# Patient Record
Sex: FEMALE | Race: BLACK OR AFRICAN AMERICAN | NOT HISPANIC OR LATINO
[De-identification: names, ages, dates, MRNs, and addresses within clinical notes are randomized per-mention and may not be internally consistent; named-entity substitution may affect disease eponyms.]

---

## 2022-11-08 PROBLEM — Z00.00 ENCOUNTER FOR PREVENTIVE HEALTH EXAMINATION: Status: ACTIVE | Noted: 2022-11-08

## 2022-11-30 PROBLEM — Z00.00 ENCOUNTER FOR PREVENTIVE HEALTH EXAMINATION: Status: ACTIVE | Noted: 2022-11-30

## 2022-12-07 ENCOUNTER — APPOINTMENT (OUTPATIENT)
Dept: OBGYN | Facility: CLINIC | Age: 27
End: 2022-12-07

## 2022-12-07 ENCOUNTER — OUTPATIENT (OUTPATIENT)
Dept: OUTPATIENT SERVICES | Facility: HOSPITAL | Age: 27
LOS: 1 days | Discharge: HOME | End: 2022-12-07

## 2022-12-07 VITALS
HEIGHT: 67 IN | DIASTOLIC BLOOD PRESSURE: 74 MMHG | SYSTOLIC BLOOD PRESSURE: 123 MMHG | BODY MASS INDEX: 26.68 KG/M2 | WEIGHT: 170 LBS

## 2022-12-07 PROCEDURE — 76815 OB US LIMITED FETUS(S): CPT | Mod: 26

## 2022-12-07 PROCEDURE — 99204 OFFICE O/P NEW MOD 45 MIN: CPT | Mod: 25

## 2022-12-08 LAB
SOURCE AMPLIFICATION: NORMAL
T VAGINALIS RRNA SPEC QL NAA+PROBE: NOT DETECTED

## 2022-12-09 LAB
C TRACH RRNA SPEC QL NAA+PROBE: NOT DETECTED
N GONORRHOEA RRNA SPEC QL NAA+PROBE: NOT DETECTED
SOURCE AMPLIFICATION: NORMAL

## 2022-12-10 DIAGNOSIS — Z34.90 ENCOUNTER FOR SUPERVISION OF NORMAL PREGNANCY, UNSPECIFIED, UNSPECIFIED TRIMESTER: ICD-10-CM

## 2022-12-11 LAB
ABO + RH PNL BLD: NORMAL
BASOPHILS # BLD AUTO: 0.05 K/UL
BASOPHILS NFR BLD AUTO: 0.7 %
BLD GP AB SCN SERPL QL: NORMAL
EOSINOPHIL # BLD AUTO: 0.47 K/UL
EOSINOPHIL NFR BLD AUTO: 6.3 %
ESTIMATED AVERAGE GLUCOSE: 103 MG/DL
HBA1C MFR BLD HPLC: 5.2 %
HBV SURFACE AG SER QL: NONREACTIVE
HCT VFR BLD CALC: 35.2 %
HCV AB SER QL: NONREACTIVE
HCV S/CO RATIO: 0.16 S/CO
HGB BLD-MCNC: 11.6 G/DL
HIV1+2 AB SPEC QL IA.RAPID: NONREACTIVE
IMM GRANULOCYTES NFR BLD AUTO: 0.3 %
LYMPHOCYTES # BLD AUTO: 2.08 K/UL
LYMPHOCYTES NFR BLD AUTO: 28 %
MAN DIFF?: NORMAL
MCHC RBC-ENTMCNC: 23.8 PG
MCHC RBC-ENTMCNC: 33 G/DL
MCV RBC AUTO: 72.3 FL
MEV IGG FLD QL IA: 139 AU/ML
MEV IGG+IGM SER-IMP: POSITIVE
MONOCYTES # BLD AUTO: 0.59 K/UL
MONOCYTES NFR BLD AUTO: 8 %
MUV AB SER-ACNC: POSITIVE
MUV IGG SER QL IA: 37.4 AU/ML
NEUTROPHILS # BLD AUTO: 4.21 K/UL
NEUTROPHILS NFR BLD AUTO: 56.7 %
PLATELET # BLD AUTO: 245 K/UL
RBC # BLD: 4.87 M/UL
RBC # FLD: 13.4 %
RUBV IGG FLD-ACNC: 3.4 INDEX
RUBV IGG SER-IMP: POSITIVE
T PALLIDUM AB SER QL IA: NEGATIVE
VZV AB TITR SER: POSITIVE
VZV IGG SER IF-ACNC: 618.4 INDEX
WBC # FLD AUTO: 7.42 K/UL

## 2022-12-12 ENCOUNTER — NON-APPOINTMENT (OUTPATIENT)
Age: 27
End: 2022-12-12

## 2022-12-12 LAB
CYTOLOGY CVX/VAG DOC THIN PREP: ABNORMAL
LEAD BLD-MCNC: <1 UG/DL

## 2022-12-12 RX ORDER — PNV NO.95/FERROUS FUM/FOLIC AC 28MG-0.8MG
27-0.8 TABLET ORAL DAILY
Qty: 90 | Refills: 3 | Status: ACTIVE | COMMUNITY
Start: 2022-12-12 | End: 1900-01-01

## 2022-12-14 LAB
HGB A MFR BLD: 97.6 %
HGB A2 MFR BLD: 2.4 %
HGB FRACT BLD-IMP: NORMAL

## 2022-12-15 LAB — FMR1 GENE MUT ANL BLD/T: NORMAL

## 2022-12-16 LAB — AR GENE MUT ANL BLD/T: NORMAL

## 2022-12-17 LAB — CFTR MUT TESTED BLD/T: NEGATIVE

## 2022-12-20 ENCOUNTER — RESULT REVIEW (OUTPATIENT)
Age: 27
End: 2022-12-20

## 2022-12-20 ENCOUNTER — OUTPATIENT (OUTPATIENT)
Dept: OUTPATIENT SERVICES | Facility: HOSPITAL | Age: 27
LOS: 1 days | Discharge: HOME | End: 2022-12-20

## 2022-12-20 DIAGNOSIS — N63.10 UNSPECIFIED LUMP IN THE RIGHT BREAST, UNSPECIFIED QUADRANT: ICD-10-CM

## 2022-12-20 PROCEDURE — 76641 ULTRASOUND BREAST COMPLETE: CPT | Mod: 26,50

## 2022-12-21 ENCOUNTER — APPOINTMENT (OUTPATIENT)
Dept: OBGYN | Facility: CLINIC | Age: 27
End: 2022-12-21

## 2022-12-22 ENCOUNTER — ASOB RESULT (OUTPATIENT)
Age: 27
End: 2022-12-22

## 2022-12-22 ENCOUNTER — OUTPATIENT (OUTPATIENT)
Dept: OUTPATIENT SERVICES | Facility: HOSPITAL | Age: 27
LOS: 1 days | Discharge: HOME | End: 2022-12-22

## 2022-12-22 ENCOUNTER — NON-APPOINTMENT (OUTPATIENT)
Age: 27
End: 2022-12-22

## 2022-12-22 ENCOUNTER — APPOINTMENT (OUTPATIENT)
Dept: ANTEPARTUM | Facility: CLINIC | Age: 27
End: 2022-12-22

## 2022-12-22 DIAGNOSIS — R92.8 OTHER ABNORMAL AND INCONCLUSIVE FINDINGS ON DIAGNOSTIC IMAGING OF BREAST: ICD-10-CM

## 2022-12-22 PROCEDURE — 76813 OB US NUCHAL MEAS 1 GEST: CPT | Mod: 26

## 2022-12-28 ENCOUNTER — RESULT REVIEW (OUTPATIENT)
Age: 27
End: 2022-12-28

## 2022-12-28 ENCOUNTER — OUTPATIENT (OUTPATIENT)
Dept: OUTPATIENT SERVICES | Facility: HOSPITAL | Age: 27
LOS: 1 days | Discharge: HOME | End: 2022-12-28
Payer: MEDICAID

## 2022-12-28 PROCEDURE — 19084 BX BREAST ADD LESION US IMAG: CPT | Mod: 59,RT

## 2022-12-28 PROCEDURE — 19083 BX BREAST 1ST LESION US IMAG: CPT | Mod: LT

## 2022-12-28 PROCEDURE — 88305 TISSUE EXAM BY PATHOLOGIST: CPT | Mod: 26

## 2022-12-29 LAB — SURGICAL PATHOLOGY STUDY: SIGNIFICANT CHANGE UP

## 2022-12-30 ENCOUNTER — NON-APPOINTMENT (OUTPATIENT)
Age: 27
End: 2022-12-30

## 2022-12-30 DIAGNOSIS — N64.89 OTHER SPECIFIED DISORDERS OF BREAST: ICD-10-CM

## 2022-12-30 DIAGNOSIS — N61.1 ABSCESS OF THE BREAST AND NIPPLE: ICD-10-CM

## 2022-12-30 DIAGNOSIS — N63.10 UNSPECIFIED LUMP IN THE RIGHT BREAST, UNSPECIFIED QUADRANT: ICD-10-CM

## 2022-12-30 DIAGNOSIS — D24.2 BENIGN NEOPLASM OF LEFT BREAST: ICD-10-CM

## 2022-12-30 DIAGNOSIS — D24.1 BENIGN NEOPLASM OF RIGHT BREAST: ICD-10-CM

## 2023-01-04 ENCOUNTER — RESULT CHARGE (OUTPATIENT)
Age: 28
End: 2023-01-04

## 2023-01-04 ENCOUNTER — NON-APPOINTMENT (OUTPATIENT)
Age: 28
End: 2023-01-04

## 2023-01-04 ENCOUNTER — APPOINTMENT (OUTPATIENT)
Dept: OBGYN | Facility: CLINIC | Age: 28
End: 2023-01-04
Payer: MEDICAID

## 2023-01-04 ENCOUNTER — OUTPATIENT (OUTPATIENT)
Dept: OUTPATIENT SERVICES | Facility: HOSPITAL | Age: 28
LOS: 1 days | Discharge: HOME | End: 2023-01-04

## 2023-01-04 VITALS
WEIGHT: 171 LBS | BODY MASS INDEX: 26.84 KG/M2 | DIASTOLIC BLOOD PRESSURE: 64 MMHG | SYSTOLIC BLOOD PRESSURE: 98 MMHG | HEIGHT: 67 IN

## 2023-01-04 DIAGNOSIS — Z23 ENCOUNTER FOR IMMUNIZATION: ICD-10-CM

## 2023-01-04 LAB
BILIRUB UR QL STRIP: NORMAL
CLARITY UR: CLEAR
COLLECTION METHOD: NORMAL
GLUCOSE UR-MCNC: NORMAL
HCG UR QL: 0.2 EU/DL
HGB UR QL STRIP.AUTO: NORMAL
KETONES UR-MCNC: NORMAL
LEUKOCYTE ESTERASE UR QL STRIP: NORMAL
NITRITE UR QL STRIP: NORMAL
PH UR STRIP: 6
PROT UR STRIP-MCNC: NORMAL
SP GR UR STRIP: 1

## 2023-01-04 PROCEDURE — 99213 OFFICE O/P EST LOW 20 MIN: CPT

## 2023-01-16 LAB
AFP MOM: 0.97
AFP VALUE: 31.6 NG/ML
ALPHA FETOPROTEIN SERUM COMMENT: NORMAL
ALPHA FETOPROTEIN SERUM INTERPRETATION: NORMAL
ALPHA FETOPROTEIN SERUM RESULTS: NORMAL
ALPHA FETOPROTEIN SERUM TEST RESULTS: NORMAL
GESTATIONAL AGE BASED ON: NORMAL
GESTATIONAL AGE ON COLLECTION DATE: 16.1 WEEKS
INSULIN DEP DIABETES: NO
MATERNAL AGE AT EDD AFP: 27 YR
MULTIPLE GESTATION: NO
OSBR RISK 1 IN: NORMAL
RACE: NORMAL
WEIGHT AFP: 171 LBS

## 2023-01-19 ENCOUNTER — APPOINTMENT (OUTPATIENT)
Dept: BREAST CENTER | Facility: CLINIC | Age: 28
End: 2023-01-19
Payer: MEDICAID

## 2023-01-19 VITALS
HEIGHT: 67 IN | BODY MASS INDEX: 27.47 KG/M2 | DIASTOLIC BLOOD PRESSURE: 74 MMHG | SYSTOLIC BLOOD PRESSURE: 115 MMHG | WEIGHT: 175 LBS

## 2023-01-19 DIAGNOSIS — N63.11 UNSPECIFIED LUMP IN THE RIGHT BREAST, UPPER OUTER QUADRANT: ICD-10-CM

## 2023-01-19 LAB
ALL CHROMOSOMES INTERPRETATION: NORMAL
ALL CHROMOSOMES TEST RESULT: NORMAL
CHROMOSOME13 INTERPRETATION: NORMAL
CHROMOSOME13 TEST RESULT: NORMAL
CHROMOSOME18 INTERPRETATION: NORMAL
CHROMOSOME18 TEST RESULT: NORMAL
CHROMOSOME21 INTERPRETATION: NORMAL
CHROMOSOME21 TEST RESULT: NORMAL
FETAL FRACTION: NORMAL
MICRODELETIONS INTERPRETATION: NORMAL
MICRODELETIONS RESULT: NORMAL
PERFORMANCE AND LIMITATIONS: NORMAL
SEX CHROMOSOME INTERPRETATION: NORMAL
SEX CHROMOSOME TEST RESULT: NORMAL
VERIFI PRENATAL TEST: NOT DETECTED

## 2023-01-19 PROCEDURE — 99204 OFFICE O/P NEW MOD 45 MIN: CPT

## 2023-01-19 NOTE — PHYSICAL EXAM
[Normocephalic] : normocephalic [EOMI] : extra ocular movement intact [No Supraclavicular Adenopathy] : no supraclavicular adenopathy [No Cervical Adenopathy] : no cervical adenopathy [Examined in the supine and seated position] : examined in the supine and seated position [No Nipple Retraction] : no left nipple retraction [No Nipple Discharge] : no left nipple discharge [No Axillary Lymphadenopathy] : no left axillary lymphadenopathy [Soft] : abdomen soft [No Rashes] : no rashes [No Ulceration] : no ulceration [Breast Nipple Inversion] : nipples not inverted [Breast Nipple Retraction] : nipples not retracted [Breast Nipple Flattening] : nipples not flattened [Breast Nipple Fissures] : nipples not fissured [de-identified] : Nl respirations [de-identified] : 9:00 approx 9cm mass, mobile [de-identified] : Nodular breasts, 7:00 1.5cm mass, mobile

## 2023-01-19 NOTE — PAST MEDICAL HISTORY
[Menarche Age ____] : age at menarche was [unfilled] [Total Preg ___] : G[unfilled] [de-identified] : Pregnant 17 weeks [FreeTextEntry3] : Pregnant 17 weeks [FreeTextEntry2] : Presently pregnant [FreeTextEntry6] : None [FreeTextEntry7] : None [FreeTextEntry8] : None

## 2023-01-19 NOTE — HISTORY OF PRESENT ILLNESS
[FreeTextEntry1] : Patient is a 27F with right breast discordant mass.\par She is currently approximately 17 weeks pregnant.\par \par FHx: Denies\par \par Her imaging is as follows: \par 12/20/22: B US (right palp) --> BIRADS 4\par Right breast\par At 9-11 o'clock 2 to 7 cm from the nipple, there is a 9 cm ovoid hypoechoic mass with slightly lobulated margins --> REC BX\par At 8:00 7 cm from the nipple, there is a 20 x 11 x 5 mm lobulated hypoechoic well-circumscribed mass --> REC BX\par Left breast:\par At 1-2 o'clock 5 cm from the nipple, there is a 23 x 18 x 8 mm lobulated hypoechoic well-circumscribed mass --> REC Bx\par At 3:00 5 cm from the nipple, there is a 10 x 7 x 4 mm lobulated hypoechoic mass.\par At 7:00 2 cm from the nipple, there is a 14 x 15 x 5 mm ovoid lobulated well-circumscribed mass\par At 6:00 2 cm from the nipple, there is an 18 x 16 x 7 mm lobulated well-circumscribed mass.\par Recommend USGB x 3 and 6 month follow up for remaining likely benign masses.\par \par 12/28/22: USGB x 3\par 1. Right breast 8:00 N7\par Fragments of fibroadenoma (top hat) (benign concordant)\par \par 2.  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 (stoplight) (DISCORDANT, recommend surgical excision)\par \par 3.  Left breast, 1 -2:00 N5\par - Fragments of fibroadenoma with foci of perilobular and periductular chronic inflammation (minicork) (benign concordant)\par \par patient states she has had a right lateral breast mass since she was in her teen that grew and shrank periodically. Since her pregnancy she has noted it has gotten bigger. Denies any associated symptoms.

## 2023-01-19 NOTE — ASSESSMENT
[FreeTextEntry1] : Patient is a 27F who is approximately 17 weeks pregnant with discordant right 9cm mass, recommended surgical excisional biopsy.  She underwent a bilateral US in 12/2022 that showed bilateral breast mass, three of which were recommended biopsy, with the rest being deemed likely benign with 6 month follow up.  Right 8N7 2cm mass was biopsied to be fragment of a FA (tophat, bening concordant).  Left 1-2N5 2.3cm mass was biopsied to be fragment of FA with chronic inflammation (minicork, benign concordant).  Right 9-11N2-7 7cm mass was biopsied to be fragment of benign tissue and fibroadenomatous change, foci of PASH and multiple foci of chronic inflammation (stoplight, discordant – recommend surgical excisional biopsy). Further discussion with radiology raised the concern of ruling out a phyllodes tumor.  It was explained to the patient that the mass was deemed discordant and therefore recommended surgical excision.  I explained a fibroadenoma to the patient. I explained that they are benign lesions that do not have malignant potential. They are usually hormonally influenced and therefore may increase in size over time. Patients with these lesions may have a slightly increased relative risk of breast cancer compared to the reference population. I described the options for management including observation with serial imaging as well as surgical excision. I explained the indications for excision including symptoms, unclear diagnosis, abnormal imaging features, discordance, or increase in size (usually to > 3 cm). With observation, the typical recommendation is to repeat the imaging every six months for a period of two years. If the mass remains stable then it no longer needs to be followed. I explained an excisional biopsy. This is also recommended to rule out a phyllodes tumor with growing fibroadenomas. The treatment for a phyllodes tumor is wide local excision with 1 cm margins. A sentinel node would not be necessary. I reviewed the risks and benefits of surgery including but not limited to infection, bleeding, cosmetic deformity, seroma formation, and the need for further surgery based upon the pathology results. We also discussed taking 1 cm margins vs just removing the mass or getting a bigger sample with the understanding that if the final pathology did reveal a phyllodes, she would likely have to go back for reoperation to obtain negative margins vs continued observation.  Patient wishes to just proceed with obtaining a bigger sample of the mass at this time with no margins. She is aware that if the mass is malignant, she may need additional surgery.  All questions and concerns were answered in detail.  Patient for right breast excisional biopsy.  Total time spent on encounter was greater than 45 minutes , which included face to face time with the patient, performing an exam, reviewing previous medical records, reviewing current imaging/ pathology, documenting in patient record and coordinating care/imaging. Greater than 50% of the encounter was spent on counseling and coordination of her breast issue.

## 2023-01-30 ENCOUNTER — OUTPATIENT (OUTPATIENT)
Dept: OUTPATIENT SERVICES | Facility: HOSPITAL | Age: 28
LOS: 1 days | Discharge: HOME | End: 2023-01-30

## 2023-01-30 VITALS
SYSTOLIC BLOOD PRESSURE: 118 MMHG | RESPIRATION RATE: 18 BRPM | TEMPERATURE: 97 F | HEART RATE: 70 BPM | DIASTOLIC BLOOD PRESSURE: 69 MMHG | WEIGHT: 171.96 LBS | HEIGHT: 67 IN | OXYGEN SATURATION: 100 %

## 2023-01-30 DIAGNOSIS — N63.11 UNSPECIFIED LUMP IN THE RIGHT BREAST, UPPER OUTER QUADRANT: ICD-10-CM

## 2023-01-30 DIAGNOSIS — K08.409 PARTIAL LOSS OF TEETH, UNSPECIFIED CAUSE, UNSPECIFIED CLASS: Chronic | ICD-10-CM

## 2023-01-30 DIAGNOSIS — Z01.818 ENCOUNTER FOR OTHER PREPROCEDURAL EXAMINATION: ICD-10-CM

## 2023-01-30 LAB
ALBUMIN SERPL ELPH-MCNC: 4.3 G/DL — SIGNIFICANT CHANGE UP (ref 3.5–5.2)
ALP SERPL-CCNC: 71 U/L — SIGNIFICANT CHANGE UP (ref 30–115)
ALT FLD-CCNC: 10 U/L — SIGNIFICANT CHANGE UP (ref 0–41)
ANION GAP SERPL CALC-SCNC: 14 MMOL/L — SIGNIFICANT CHANGE UP (ref 7–14)
AST SERPL-CCNC: 12 U/L — SIGNIFICANT CHANGE UP (ref 0–41)
BASOPHILS # BLD AUTO: 0.05 K/UL — SIGNIFICANT CHANGE UP (ref 0–0.2)
BASOPHILS NFR BLD AUTO: 0.6 % — SIGNIFICANT CHANGE UP (ref 0–1)
BILIRUB SERPL-MCNC: 0.3 MG/DL — SIGNIFICANT CHANGE UP (ref 0.2–1.2)
BUN SERPL-MCNC: 7 MG/DL — LOW (ref 10–20)
CALCIUM SERPL-MCNC: 9 MG/DL — SIGNIFICANT CHANGE UP (ref 8.4–10.5)
CHLORIDE SERPL-SCNC: 103 MMOL/L — SIGNIFICANT CHANGE UP (ref 98–110)
CO2 SERPL-SCNC: 20 MMOL/L — SIGNIFICANT CHANGE UP (ref 17–32)
CREAT SERPL-MCNC: 0.6 MG/DL — LOW (ref 0.7–1.5)
EGFR: 127 ML/MIN/1.73M2 — SIGNIFICANT CHANGE UP
EOSINOPHIL # BLD AUTO: 0.52 K/UL — SIGNIFICANT CHANGE UP (ref 0–0.7)
EOSINOPHIL NFR BLD AUTO: 5.7 % — SIGNIFICANT CHANGE UP (ref 0–8)
GLUCOSE SERPL-MCNC: 71 MG/DL — SIGNIFICANT CHANGE UP (ref 70–99)
HCG SERPL-ACNC: HIGH MIU/ML
HCT VFR BLD CALC: 30.5 % — LOW (ref 37–47)
HGB BLD-MCNC: 10 G/DL — LOW (ref 12–16)
IMM GRANULOCYTES NFR BLD AUTO: 0.3 % — SIGNIFICANT CHANGE UP (ref 0.1–0.3)
LYMPHOCYTES # BLD AUTO: 2.51 K/UL — SIGNIFICANT CHANGE UP (ref 1.2–3.4)
LYMPHOCYTES # BLD AUTO: 27.7 % — SIGNIFICANT CHANGE UP (ref 20.5–51.1)
MCHC RBC-ENTMCNC: 23.8 PG — LOW (ref 27–31)
MCHC RBC-ENTMCNC: 32.8 G/DL — SIGNIFICANT CHANGE UP (ref 32–37)
MCV RBC AUTO: 72.6 FL — LOW (ref 81–99)
MONOCYTES # BLD AUTO: 0.65 K/UL — HIGH (ref 0.1–0.6)
MONOCYTES NFR BLD AUTO: 7.2 % — SIGNIFICANT CHANGE UP (ref 1.7–9.3)
NEUTROPHILS # BLD AUTO: 5.29 K/UL — SIGNIFICANT CHANGE UP (ref 1.4–6.5)
NEUTROPHILS NFR BLD AUTO: 58.5 % — SIGNIFICANT CHANGE UP (ref 42.2–75.2)
NRBC # BLD: 0 /100 WBCS — SIGNIFICANT CHANGE UP (ref 0–0)
PLATELET # BLD AUTO: 214 K/UL — SIGNIFICANT CHANGE UP (ref 130–400)
POTASSIUM SERPL-MCNC: 4.5 MMOL/L — SIGNIFICANT CHANGE UP (ref 3.5–5)
POTASSIUM SERPL-SCNC: 4.5 MMOL/L — SIGNIFICANT CHANGE UP (ref 3.5–5)
PROT SERPL-MCNC: 6.4 G/DL — SIGNIFICANT CHANGE UP (ref 6–8)
RBC # BLD: 4.2 M/UL — SIGNIFICANT CHANGE UP (ref 4.2–5.4)
RBC # FLD: 15.4 % — HIGH (ref 11.5–14.5)
SODIUM SERPL-SCNC: 137 MMOL/L — SIGNIFICANT CHANGE UP (ref 135–146)
WBC # BLD: 9.05 K/UL — SIGNIFICANT CHANGE UP (ref 4.8–10.8)
WBC # FLD AUTO: 9.05 K/UL — SIGNIFICANT CHANGE UP (ref 4.8–10.8)

## 2023-01-30 NOTE — H&P PST ADULT - OTHER CARE PROVIDERS
[facility] lv 1 wk appt dr leon 2/3 for preop eval

## 2023-01-30 NOTE — H&P PST ADULT - REASON FOR ADMISSION
27 yr old female to past for  right breast wide local excision with needle localization under lsb in [place] dr vieira on 2/6/23

## 2023-01-30 NOTE — H&P PST ADULT - NSICDXFAMILYHX_GEN_ALL_CORE_FT
FAMILY HISTORY:  Grandparent  Still living? Unknown  FHx: breast cancer, Age at diagnosis: Age Unknown

## 2023-01-30 NOTE — H&P PST ADULT - PATIENT OPTIMIZED PENDING
pending ob gyn eval as pt is 17-18 wks pregnant req by anes for iv sed in [place] ? any need for fetal monitoring will be asked

## 2023-01-30 NOTE — H&P PST ADULT - HISTORY OF PRESENT ILLNESS
27 yr old female to past for  right breast wide local excision with needle localization under lsb in [place] dr vieira on 23  pt reports a history of right breast mass many yrs inc in size last 5 mos had sono bx + fibroadenoma benign but states mass can grow in size with hormonal changes so opts for excision   pt is 4 1/2 mos pregnant approx 17-18 weeks with her first child  lmp  22 EDC  23  pt has appt for eval with ob gyn 2/3 will fax preop eval regarding any concerns for this procedure under lsb in [place] (ie fetal monitor etc ) as discussed with anes today x [phone] pt also aware and will call office tomorrow states ob is aware of her sx referred to the sx by same   Pt reports no cardiopulmonary issues denies sob/mandel/cp/palpitations. Pt states no recent infections no fever no cough no uti uri. Stated exercise tolerance is   3-4  flights no changes. Maurizio screen revd.  Pt denies any s/s covid 19 and reports no contact with known positive people. Pt has appointment for repeat covid testing pre op and instructed to continue to self monitor and report any concerns to MD. Pt will continue to practice self isolation and  exposure control measures pre op  s/p vac and booster  Anesthesia Alert  NO--Difficult Airway  NO--History of neck surgery or radiation  NO--Limited ROM of neck  NO--History of Malignant hyperthermia  NO--No personal or family history of Pseudocholinesterase deficiency.  NO--Prior Anesthesia Complication  NO--Latex Allergy  NO--Loose teeth  NO--History of Rheumatoid Arthritis  NO--MAURIZIO  YRS --Other_____PT IS APPROX !& WEEKS PREGNANT     N63.11,Z234.90/24915    Mass of upper outer quadrant of right breast    Encounter for other preprocedural examination    ^N63.11,Z234.90/03455    Mass of upper outer quadrant of right breast    Encounter for other preprocedural examination

## 2023-01-31 ENCOUNTER — NON-APPOINTMENT (OUTPATIENT)
Age: 28
End: 2023-01-31

## 2023-02-01 ENCOUNTER — APPOINTMENT (OUTPATIENT)
Dept: OBGYN | Facility: CLINIC | Age: 28
End: 2023-02-01
Payer: MEDICAID

## 2023-02-01 ENCOUNTER — OUTPATIENT (OUTPATIENT)
Dept: OUTPATIENT SERVICES | Facility: HOSPITAL | Age: 28
LOS: 1 days | Discharge: HOME | End: 2023-02-01

## 2023-02-01 ENCOUNTER — RESULT CHARGE (OUTPATIENT)
Age: 28
End: 2023-02-01

## 2023-02-01 VITALS — DIASTOLIC BLOOD PRESSURE: 80 MMHG | SYSTOLIC BLOOD PRESSURE: 119 MMHG | BODY MASS INDEX: 27.1 KG/M2 | WEIGHT: 173 LBS

## 2023-02-01 DIAGNOSIS — K08.409 PARTIAL LOSS OF TEETH, UNSPECIFIED CAUSE, UNSPECIFIED CLASS: Chronic | ICD-10-CM

## 2023-02-01 LAB
BILIRUB UR QL STRIP: NEGATIVE
CLARITY UR: CLEAR
COLLECTION METHOD: NORMAL
GLUCOSE UR-MCNC: NEGATIVE
HCG UR QL: NEGATIVE EU/DL
HGB UR QL STRIP.AUTO: NEGATIVE
KETONES UR-MCNC: NEGATIVE
LEUKOCYTE ESTERASE UR QL STRIP: NEGATIVE
NITRITE UR QL STRIP: NEGATIVE
PH UR STRIP: 6
PROT UR STRIP-MCNC: NEGATIVE
SP GR UR STRIP: 1.02

## 2023-02-01 PROCEDURE — 99213 OFFICE O/P EST LOW 20 MIN: CPT

## 2023-02-06 ENCOUNTER — TRANSCRIPTION ENCOUNTER (OUTPATIENT)
Age: 28
End: 2023-02-06

## 2023-02-06 ENCOUNTER — OUTPATIENT (OUTPATIENT)
Dept: INPATIENT UNIT | Facility: HOSPITAL | Age: 28
LOS: 1 days | Discharge: HOME | End: 2023-02-06
Payer: MEDICAID

## 2023-02-06 ENCOUNTER — RESULT REVIEW (OUTPATIENT)
Age: 28
End: 2023-02-06

## 2023-02-06 ENCOUNTER — APPOINTMENT (OUTPATIENT)
Dept: BREAST CENTER | Facility: AMBULATORY SURGERY CENTER | Age: 28
End: 2023-02-06
Payer: MEDICAID

## 2023-02-06 VITALS
HEART RATE: 91 BPM | SYSTOLIC BLOOD PRESSURE: 108 MMHG | TEMPERATURE: 98 F | HEIGHT: 67 IN | RESPIRATION RATE: 18 BRPM | DIASTOLIC BLOOD PRESSURE: 67 MMHG | OXYGEN SATURATION: 100 % | WEIGHT: 171.96 LBS

## 2023-02-06 VITALS
DIASTOLIC BLOOD PRESSURE: 63 MMHG | OXYGEN SATURATION: 98 % | HEART RATE: 87 BPM | RESPIRATION RATE: 13 BRPM | SYSTOLIC BLOOD PRESSURE: 99 MMHG

## 2023-02-06 DIAGNOSIS — K08.409 PARTIAL LOSS OF TEETH, UNSPECIFIED CAUSE, UNSPECIFIED CLASS: Chronic | ICD-10-CM

## 2023-02-06 DIAGNOSIS — D24.1 BENIGN NEOPLASM OF RIGHT BREAST: ICD-10-CM

## 2023-02-06 DIAGNOSIS — N63.11 UNSPECIFIED LUMP IN THE RIGHT BREAST, UPPER OUTER QUADRANT: ICD-10-CM

## 2023-02-06 DIAGNOSIS — N63.10 UNSPECIFIED LUMP IN THE RIGHT BREAST, UNSPECIFIED QUADRANT: ICD-10-CM

## 2023-02-06 DIAGNOSIS — N60.21 FIBROADENOSIS OF RIGHT BREAST: ICD-10-CM

## 2023-02-06 PROCEDURE — C1889: CPT

## 2023-02-06 PROCEDURE — 19125 EXCISION BREAST LESION: CPT | Mod: RT

## 2023-02-06 PROCEDURE — XXXXX: CPT | Mod: 1L

## 2023-02-06 PROCEDURE — 88305 TISSUE EXAM BY PATHOLOGIST: CPT

## 2023-02-06 PROCEDURE — 88305 TISSUE EXAM BY PATHOLOGIST: CPT | Mod: 26

## 2023-02-06 PROCEDURE — 19285 PERQ DEV BREAST 1ST US IMAG: CPT | Mod: RT

## 2023-02-06 RX ORDER — ACETAMINOPHEN 500 MG
975 TABLET ORAL ONCE
Refills: 0 | Status: DISCONTINUED | OUTPATIENT
Start: 2023-02-06 | End: 2023-02-06

## 2023-02-06 RX ORDER — ONDANSETRON 8 MG/1
4 TABLET, FILM COATED ORAL ONCE
Refills: 0 | Status: DISCONTINUED | OUTPATIENT
Start: 2023-02-06 | End: 2023-02-06

## 2023-02-06 RX ORDER — SODIUM CHLORIDE 9 MG/ML
1000 INJECTION, SOLUTION INTRAVENOUS
Refills: 0 | Status: DISCONTINUED | OUTPATIENT
Start: 2023-02-06 | End: 2023-02-06

## 2023-02-06 NOTE — BRIEF OPERATIVE NOTE - OPERATION/FINDINGS
Pre and post-operative fetal monitoring performed by Ob/Gyn. Intraoperatively, a palpable lobulated mass at 9 position of right breast. Excised with end of guidewire removed with mass.

## 2023-02-06 NOTE — PRE-ANESTHESIA EVALUATION ADULT - NSANTHPMHFT_GEN_ALL_CORE
METS>4 without CP/palpitations/sob  Denies orthopnea  18 weeks pregnant EDC  23 , normal prenatal course so far  seen by OB/Gyn on 23 and cleared for surgery - pre and post FHR checks recommended

## 2023-02-06 NOTE — ASU DISCHARGE PLAN (ADULT/PEDIATRIC) - ASU DC SPECIAL INSTRUCTIONSFT
You may shower in 24 to 48 hours.  You have a compressive bra on, please keep this on or a sports bra that provides compression for the next two weeks. It should remain on 24/7 except for showering.  Please do not soak the wound.  You may take over-the-counter pain medications, such as Tylenol, for pain if needed.  Please avoid heavy lifting or excessive activity especially with the right upper extremity for the next 4 weeks.  Please follow up with Dr. Byrnes in 10-14 days. Her office will call you within the next day or two. If you do not hear from the office, please call. Her office number is included in this documentation. Please call with any questions or concerns you may have.

## 2023-02-06 NOTE — CHART NOTE - NSCHARTNOTEFT_GEN_A_CORE
PACU ANESTHESIA ADMISSION NOTE      Procedure: Excision of right breast mass with needle localization      Post op diagnosis:  Breast mass, right        ____  Intubated  TV:______       Rate: ______      FiO2: ______    x Patent Airway    __x__  Full return of protective reflexes    __x__  Full recovery from anesthesia / back to baseline     Vitals:   T:   97        R:    14              BP: 110/72                  Sat:   100                P: 70      Mental Status:  __x__ Awake   _____ Alert   _____ Drowsy   _____ Sedated    Nausea/Vomiting:  ___x_ NO  ______Yes,   See Post - Op Orders          Pain Scale (0-10):  _____    Treatment: ____ None    __x__ See Post - Op/PCA Orders    Post - Operative Fluids:   ___x Oral   ____ See Post - Op Orders    Plan: Discharge:   _x___Home       _____Floor     _____Critical Care    _____  Other:_________________    Comments: Patient tolerated procedure well.
PGY2 NOTE    Patient seen at bedside in PACU post-op. FHR 145bpm.     Dr Emery aware
PGY2 NOTE    Patient seen at bedside pre-op for bedside sonogram.   FHR 148bpm. Gross fetal movement noted.     Will do post-op sonogram as well.     Dr Emery aware

## 2023-02-06 NOTE — PRE-ANESTHESIA EVALUATION ADULT - TEMPERATURE IN CELSIUS (DEGREES C)
36.9 Consent (Spinal Accessory)/Introductory Paragraph: The rationale for Mohs was explained to the patient and consent was obtained. The risks, benefits and alternatives to therapy were discussed in detail. Specifically, the risks of damage to the spinal accessory nerve, infection, scarring, bleeding, prolonged wound healing, incomplete removal, allergy to anesthesia, and recurrence were addressed. Prior to the procedure, the treatment site was clearly identified and confirmed by the patient. All components of Universal Protocol/PAUSE Rule completed.

## 2023-02-06 NOTE — ASU DISCHARGE PLAN (ADULT/PEDIATRIC) - CARE PROVIDER_API CALL
Ana Byrnes (DO)  Surgery  Compr Breast  [address], [facility] [address] [place] [address]  Phone: [phone]  Fax: [phone]  Follow Up Time: 2 weeks

## 2023-02-06 NOTE — PRE-ANESTHESIA EVALUATION ADULT - NSANTHADDINFOFT_GEN_ALL_CORE
MAC planned, backup GA  Discussed risks, including dental injury and more serious complications including cardiac and pulmonary complications and stroke.  Patient expresses understanding with regard to risks of anesthesia and wishes to proceed.    discussed with patient risks of anesthesia while pregnant. Will avoid midazolam and opioids unless necessary. Plan for propofol only sedation with local by surgeon.

## 2023-02-06 NOTE — ASU DISCHARGE PLAN (ADULT/PEDIATRIC) - NS MD DC FALL RISK RISK
For information on Fall & Injury Prevention, visit: [email or web address] OR  [email or web address] OR  https://www.cdc.gov/steadi/patient.html

## 2023-02-06 NOTE — BRIEF OPERATIVE NOTE - NSICDXBRIEFPROCEDURE_GEN_ALL_CORE_FT
PROCEDURES:  Excision of right breast mass with needle localization 06-Feb-2023 14:28:08  Matt Borges

## 2023-02-08 PROBLEM — D24.1 BENIGN NEOPLASM OF RIGHT BREAST: Chronic | Status: ACTIVE | Noted: 2023-01-30

## 2023-02-10 ENCOUNTER — ASOB RESULT (OUTPATIENT)
Age: 28
End: 2023-02-10

## 2023-02-10 ENCOUNTER — OUTPATIENT (OUTPATIENT)
Dept: OUTPATIENT SERVICES | Facility: HOSPITAL | Age: 28
LOS: 1 days | End: 2023-02-10
Payer: MEDICAID

## 2023-02-10 ENCOUNTER — APPOINTMENT (OUTPATIENT)
Dept: ANTEPARTUM | Facility: CLINIC | Age: 28
End: 2023-02-10

## 2023-02-10 ENCOUNTER — APPOINTMENT (OUTPATIENT)
Dept: ANTEPARTUM | Facility: CLINIC | Age: 28
End: 2023-02-10
Payer: MEDICAID

## 2023-02-10 DIAGNOSIS — Z34.90 ENCOUNTER FOR SUPERVISION OF NORMAL PREGNANCY, UNSPECIFIED, UNSPECIFIED TRIMESTER: ICD-10-CM

## 2023-02-10 DIAGNOSIS — K08.409 PARTIAL LOSS OF TEETH, UNSPECIFIED CAUSE, UNSPECIFIED CLASS: Chronic | ICD-10-CM

## 2023-02-10 PROCEDURE — 76805 OB US >/= 14 WKS SNGL FETUS: CPT | Mod: 26

## 2023-02-10 PROCEDURE — 76817 TRANSVAGINAL US OBSTETRIC: CPT

## 2023-02-10 PROCEDURE — 76817 TRANSVAGINAL US OBSTETRIC: CPT | Mod: 26

## 2023-02-10 PROCEDURE — 76805 OB US >/= 14 WKS SNGL FETUS: CPT

## 2023-02-13 DIAGNOSIS — O34.12 MATERNAL CARE FOR BENIGN TUMOR OF CORPUS UTERI, SECOND TRIMESTER: ICD-10-CM

## 2023-02-13 DIAGNOSIS — Z3A.20 20 WEEKS GESTATION OF PREGNANCY: ICD-10-CM

## 2023-02-13 LAB — SURGICAL PATHOLOGY STUDY: SIGNIFICANT CHANGE UP

## 2023-02-21 ENCOUNTER — APPOINTMENT (OUTPATIENT)
Dept: BREAST CENTER | Facility: CLINIC | Age: 28
End: 2023-02-21
Payer: MEDICAID

## 2023-02-21 VITALS
HEIGHT: 67 IN | DIASTOLIC BLOOD PRESSURE: 83 MMHG | BODY MASS INDEX: 27.47 KG/M2 | WEIGHT: 175 LBS | SYSTOLIC BLOOD PRESSURE: 122 MMHG

## 2023-02-21 PROCEDURE — 99024 POSTOP FOLLOW-UP VISIT: CPT

## 2023-02-21 NOTE — PHYSICAL EXAM
[Normocephalic] : normocephalic [EOMI] : extra ocular movement intact [Soft] : abdomen soft [No Rashes] : no rashes [No Ulceration] : no ulceration [de-identified] : Nl respirations [de-identified] : Incision site healing well with no signs of wound infection or dehiscence

## 2023-02-21 NOTE — HISTORY OF PRESENT ILLNESS
[FreeTextEntry1] : Patient is a 27F with right breast discordant mass s/p excision on 2/6/23 with final path showing FA and adjacent IDP.\par She is currently approximately 21 weeks pregnant.\par \par FHx: Denies\par \par Her imaging is as follows: \par 12/20/22: B US (right palp) --> BIRADS 4\par Right breast\par At 9-11 o'clock 2 to 7 cm from the nipple, there is a 9 cm ovoid hypoechoic mass with slightly lobulated margins --> REC BX\par At 8:00 7 cm from the nipple, there is a 20 x 11 x 5 mm lobulated hypoechoic well-circumscribed mass --> REC BX\par Left breast:\par At 1-2 o'clock 5 cm from the nipple, there is a 23 x 18 x 8 mm lobulated hypoechoic well-circumscribed mass --> REC Bx\par At 3:00 5 cm from the nipple, there is a 10 x 7 x 4 mm lobulated hypoechoic mass.\par At 7:00 2 cm from the nipple, there is a 14 x 15 x 5 mm ovoid lobulated well-circumscribed mass\par At 6:00 2 cm from the nipple, there is an 18 x 16 x 7 mm lobulated well-circumscribed mass.\par Recommend USGB x 3 and 6 month follow up for remaining likely benign masses.\par \par 12/28/22: USGB x 3\par 1. Right breast 8:00 N7\par Fragments of fibroadenoma (top hat) (benign concordant)\par \par 2.  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 (stoplight) (DISCORDANT, recommend surgical excision)\par \par 3.  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

## 2023-02-21 NOTE — PAST MEDICAL HISTORY
[Menarche Age ____] : age at menarche was [unfilled] [Total Preg ___] : G[unfilled] [de-identified] : Pregnant 17 weeks [FreeTextEntry3] : Pregnant 17 weeks [FreeTextEntry2] : Presently pregnant [FreeTextEntry6] : None [FreeTextEntry7] : None [FreeTextEntry8] : None

## 2023-02-21 NOTE — ASSESSMENT
[FreeTextEntry1] : Patient is a 27F who is approximately 21 weeks pregnant with discordant right 9cm mass, s/p excision on 2/6/23 with pathology showing FA with adjacent IDP.  She underwent a bilateral US in 12/2022 that showed bilateral breast mass, three of which were recommended biopsy, with the rest being deemed likely benign with 6 month follow up.  Right 8N7 2cm mass was biopsied to be fragment of a FA (tophat, bening concordant).  Left 1-2N5 2.3cm mass was biopsied to be fragment of FA with chronic inflammation (minicork, benign concordant).  Right 9-11N2-7 7cm mass was biopsied to be fragment of benign tissue and fibroadenomatous change, foci of PASH and multiple foci of chronic inflammation (stoplight, discordant – recommend surgical excisional biopsy). Further discussion with radiology raised the concern of ruling out a phyllodes tumor.  She underwent excision on 2/6/23 that showed a FA with an adjacent IDP.  I explained a fibroadenoma to the patient. I explained that they are benign lesions that do not have malignant potential. They are usually hormonally influenced and therefore may increase in size over time. Patients with these lesions may have a slightly increased relative risk of breast cancer compared to the reference population. We discussed that there was no concerns for phyllodes on the final pathology, there is no need for further surgery at this time.  All questions and concerns were answered in detail.  Patient is for B US in 7/2023 for masses for short term follow up.  She is to follow up after imaging, pending any interval changes.  Total time spent on encounter was greater than 30 minutes , which included face to face time with the patient, performing an exam, reviewing previous medical records, reviewing current imaging/ pathology, documenting in patient record and coordinating care/imaging. Greater than 50% of the encounter was spent on counseling and coordination of her breast issue.

## 2023-02-27 ENCOUNTER — NON-APPOINTMENT (OUTPATIENT)
Age: 28
End: 2023-02-27

## 2023-02-28 ENCOUNTER — NON-APPOINTMENT (OUTPATIENT)
Age: 28
End: 2023-02-28

## 2023-03-01 ENCOUNTER — APPOINTMENT (OUTPATIENT)
Dept: OBGYN | Facility: CLINIC | Age: 28
End: 2023-03-01
Payer: MEDICAID

## 2023-03-01 ENCOUNTER — NON-APPOINTMENT (OUTPATIENT)
Age: 28
End: 2023-03-01

## 2023-03-01 ENCOUNTER — APPOINTMENT (OUTPATIENT)
Dept: OBGYN | Facility: CLINIC | Age: 28
End: 2023-03-01

## 2023-03-01 ENCOUNTER — OUTPATIENT (OUTPATIENT)
Dept: OUTPATIENT SERVICES | Facility: HOSPITAL | Age: 28
LOS: 1 days | End: 2023-03-01
Payer: MEDICAID

## 2023-03-01 ENCOUNTER — RESULT CHARGE (OUTPATIENT)
Age: 28
End: 2023-03-01

## 2023-03-01 VITALS
DIASTOLIC BLOOD PRESSURE: 78 MMHG | WEIGHT: 182 LBS | HEIGHT: 67 IN | SYSTOLIC BLOOD PRESSURE: 110 MMHG | BODY MASS INDEX: 28.56 KG/M2

## 2023-03-01 DIAGNOSIS — K08.409 PARTIAL LOSS OF TEETH, UNSPECIFIED CAUSE, UNSPECIFIED CLASS: Chronic | ICD-10-CM

## 2023-03-01 DIAGNOSIS — Z34.90 ENCOUNTER FOR SUPERVISION OF NORMAL PREGNANCY, UNSPECIFIED, UNSPECIFIED TRIMESTER: ICD-10-CM

## 2023-03-01 PROCEDURE — 99213 OFFICE O/P EST LOW 20 MIN: CPT

## 2023-03-01 PROCEDURE — 81002 URINALYSIS NONAUTO W/O SCOPE: CPT

## 2023-03-02 DIAGNOSIS — Z34.90 ENCOUNTER FOR SUPERVISION OF NORMAL PREGNANCY, UNSPECIFIED, UNSPECIFIED TRIMESTER: ICD-10-CM

## 2023-03-02 LAB
BILIRUB UR QL STRIP: NORMAL
CLARITY UR: CLEAR
COLLECTION METHOD: NORMAL
GLUCOSE UR-MCNC: NORMAL
HCG UR QL: 0.2 EU/DL
HGB UR QL STRIP.AUTO: NORMAL
KETONES UR-MCNC: NORMAL
LEUKOCYTE ESTERASE UR QL STRIP: NORMAL
NITRITE UR QL STRIP: NORMAL
PH UR STRIP: 6
PROT UR STRIP-MCNC: NORMAL
SP GR UR STRIP: 1.01

## 2023-03-07 ENCOUNTER — NON-APPOINTMENT (OUTPATIENT)
Age: 28
End: 2023-03-07

## 2023-04-04 ENCOUNTER — NON-APPOINTMENT (OUTPATIENT)
Age: 28
End: 2023-04-04

## 2023-04-05 ENCOUNTER — OUTPATIENT (OUTPATIENT)
Dept: OUTPATIENT SERVICES | Facility: HOSPITAL | Age: 28
LOS: 1 days | End: 2023-04-05
Payer: MEDICAID

## 2023-04-05 ENCOUNTER — RESULT CHARGE (OUTPATIENT)
Age: 28
End: 2023-04-05

## 2023-04-05 ENCOUNTER — APPOINTMENT (OUTPATIENT)
Dept: OBGYN | Facility: CLINIC | Age: 28
End: 2023-04-05
Payer: MEDICAID

## 2023-04-05 VITALS
BODY MASS INDEX: 29.51 KG/M2 | HEIGHT: 67 IN | SYSTOLIC BLOOD PRESSURE: 124 MMHG | DIASTOLIC BLOOD PRESSURE: 73 MMHG | WEIGHT: 188 LBS

## 2023-04-05 DIAGNOSIS — K08.409 PARTIAL LOSS OF TEETH, UNSPECIFIED CAUSE, UNSPECIFIED CLASS: Chronic | ICD-10-CM

## 2023-04-05 DIAGNOSIS — Z34.90 ENCOUNTER FOR SUPERVISION OF NORMAL PREGNANCY, UNSPECIFIED, UNSPECIFIED TRIMESTER: ICD-10-CM

## 2023-04-05 PROCEDURE — 81002 URINALYSIS NONAUTO W/O SCOPE: CPT

## 2023-04-05 PROCEDURE — 99213 OFFICE O/P EST LOW 20 MIN: CPT

## 2023-04-07 DIAGNOSIS — Z34.90 ENCOUNTER FOR SUPERVISION OF NORMAL PREGNANCY, UNSPECIFIED, UNSPECIFIED TRIMESTER: ICD-10-CM

## 2023-04-15 LAB
BASOPHILS # BLD AUTO: 0.03 K/UL
BASOPHILS NFR BLD AUTO: 0.4 %
EOSINOPHIL # BLD AUTO: 0.15 K/UL
EOSINOPHIL NFR BLD AUTO: 1.9 %
GLUCOSE 1H P 50 G GLC PO SERPL-MCNC: 133 MG/DL
HCT VFR BLD CALC: 33.4 %
HGB BLD-MCNC: 10.1 G/DL
IMM GRANULOCYTES NFR BLD AUTO: 0.3 %
LYMPHOCYTES # BLD AUTO: 1.59 K/UL
LYMPHOCYTES NFR BLD AUTO: 20.3 %
MAN DIFF?: NORMAL
MCHC RBC-ENTMCNC: 23.1 PG
MCHC RBC-ENTMCNC: 30.2 G/DL
MCV RBC AUTO: 76.3 FL
MONOCYTES # BLD AUTO: 0.52 K/UL
MONOCYTES NFR BLD AUTO: 6.6 %
NEUTROPHILS # BLD AUTO: 5.53 K/UL
NEUTROPHILS NFR BLD AUTO: 70.5 %
PLATELET # BLD AUTO: 192 K/UL
RBC # BLD: 4.38 M/UL
RBC # FLD: 14 %
WBC # FLD AUTO: 7.84 K/UL

## 2023-04-17 ENCOUNTER — NON-APPOINTMENT (OUTPATIENT)
Age: 28
End: 2023-04-17

## 2023-04-17 RX ORDER — FERROUS SULFATE 325(65) MG
325 (65 FE) TABLET ORAL TWICE DAILY
Qty: 90 | Refills: 3 | Status: ACTIVE | COMMUNITY
Start: 2023-04-17 | End: 1900-01-01

## 2023-04-18 ENCOUNTER — NON-APPOINTMENT (OUTPATIENT)
Age: 28
End: 2023-04-18

## 2023-04-18 RX ORDER — FERROUS SULFATE 325(65) MG
325 (65 FE) TABLET ORAL TWICE DAILY
Qty: 90 | Refills: 3 | Status: ACTIVE | COMMUNITY
Start: 2023-04-18 | End: 1900-01-01

## 2023-04-19 ENCOUNTER — NON-APPOINTMENT (OUTPATIENT)
Age: 28
End: 2023-04-19

## 2023-04-19 ENCOUNTER — OUTPATIENT (OUTPATIENT)
Dept: OUTPATIENT SERVICES | Facility: HOSPITAL | Age: 28
LOS: 1 days | End: 2023-04-19
Payer: MEDICAID

## 2023-04-19 ENCOUNTER — RESULT CHARGE (OUTPATIENT)
Age: 28
End: 2023-04-19

## 2023-04-19 ENCOUNTER — APPOINTMENT (OUTPATIENT)
Dept: OBGYN | Facility: CLINIC | Age: 28
End: 2023-04-19
Payer: MEDICAID

## 2023-04-19 VITALS
HEIGHT: 67 IN | DIASTOLIC BLOOD PRESSURE: 70 MMHG | WEIGHT: 190 LBS | BODY MASS INDEX: 29.82 KG/M2 | SYSTOLIC BLOOD PRESSURE: 120 MMHG

## 2023-04-19 DIAGNOSIS — R80.9 PROTEINURIA, UNSPECIFIED: ICD-10-CM

## 2023-04-19 DIAGNOSIS — K08.409 PARTIAL LOSS OF TEETH, UNSPECIFIED CAUSE, UNSPECIFIED CLASS: Chronic | ICD-10-CM

## 2023-04-19 DIAGNOSIS — Z34.90 ENCOUNTER FOR SUPERVISION OF NORMAL PREGNANCY, UNSPECIFIED, UNSPECIFIED TRIMESTER: ICD-10-CM

## 2023-04-19 LAB
BILIRUB UR QL STRIP: NORMAL
CLARITY UR: CLEAR
COLLECTION METHOD: NORMAL
GLUCOSE UR-MCNC: NORMAL
HCG UR QL: 0.2 EU/DL
HGB UR QL STRIP.AUTO: NORMAL
KETONES UR-MCNC: NORMAL
LEUKOCYTE ESTERASE UR QL STRIP: NORMAL
NITRITE UR QL STRIP: NORMAL
PH UR STRIP: 6
PROT UR STRIP-MCNC: NORMAL
SP GR UR STRIP: 1.02

## 2023-04-19 PROCEDURE — 99213 OFFICE O/P EST LOW 20 MIN: CPT

## 2023-04-19 PROCEDURE — 81002 URINALYSIS NONAUTO W/O SCOPE: CPT

## 2023-04-19 PROCEDURE — 36415 COLL VENOUS BLD VENIPUNCTURE: CPT

## 2023-04-19 PROCEDURE — 82570 ASSAY OF URINE CREATININE: CPT

## 2023-04-21 ENCOUNTER — NON-APPOINTMENT (OUTPATIENT)
Age: 28
End: 2023-04-21

## 2023-04-21 DIAGNOSIS — R80.9 PROTEINURIA, UNSPECIFIED: ICD-10-CM

## 2023-04-21 DIAGNOSIS — Z34.90 ENCOUNTER FOR SUPERVISION OF NORMAL PREGNANCY, UNSPECIFIED, UNSPECIFIED TRIMESTER: ICD-10-CM

## 2023-04-21 LAB
ALBUMIN SERPL ELPH-MCNC: 3.9 G/DL
ALP BLD-CCNC: 113 U/L
ALT SERPL-CCNC: 14 U/L
ANION GAP SERPL CALC-SCNC: 10 MMOL/L
APTT BLD: 26.5 SEC
AST SERPL-CCNC: 13 U/L
BASOPHILS # BLD AUTO: 0.04 K/UL
BASOPHILS NFR BLD AUTO: 0.5 %
BILIRUB SERPL-MCNC: <0.2 MG/DL
BUN SERPL-MCNC: <5 MG/DL
CALCIUM SERPL-MCNC: 9.1 MG/DL
CHLORIDE SERPL-SCNC: 102 MMOL/L
CO2 SERPL-SCNC: 22 MMOL/L
CREAT 24H UR-MCNC: 1.1 G/24 HR
CREAT ?TM UR-MCNC: 41 MG/DL
CREAT SERPL-MCNC: 0.6 MG/DL
CREAT SPEC-SCNC: 57 MG/DL
CREAT/PROT UR: 0.7 RATIO
EGFR: 127 ML/MIN/1.73M2
EOSINOPHIL # BLD AUTO: 0.3 K/UL
EOSINOPHIL NFR BLD AUTO: 3.5 %
GLUCOSE SERPL-MCNC: 90 MG/DL
HCT VFR BLD CALC: 31.5 %
HGB BLD-MCNC: 9.9 G/DL
IMM GRANULOCYTES NFR BLD AUTO: 0.4 %
INR PPP: 0.96 RATIO
LDH SERPL-CCNC: 169
LYMPHOCYTES # BLD AUTO: 1.97 K/UL
LYMPHOCYTES NFR BLD AUTO: 23.1 %
MAN DIFF?: NORMAL
MCHC RBC-ENTMCNC: 22.9 PG
MCHC RBC-ENTMCNC: 31.4 G/DL
MCV RBC AUTO: 72.7 FL
MONOCYTES # BLD AUTO: 0.7 K/UL
MONOCYTES NFR BLD AUTO: 8.2 %
NEUTROPHILS # BLD AUTO: 5.49 K/UL
NEUTROPHILS NFR BLD AUTO: 64.3 %
PLATELET # BLD AUTO: 204 K/UL
POTASSIUM SERPL-SCNC: 4.1 MMOL/L
PROT 24H UR-MRATE: 19 MG/DL
PROT ?TM UR-MCNC: 24 HR
PROT SERPL-MCNC: 6.2 G/DL
PROT UR-MCNC: 39 MG/DLG/24H
PROT UR-MCNC: 522 MG/24 H
PT BLD: 10.9 SEC
RBC # BLD: 4.33 M/UL
RBC # FLD: 13.6 %
SODIUM SERPL-SCNC: 134 MMOL/L
SPECIMEN VOL 24H UR: 2750 ML
URATE SERPL-MCNC: 5.1 MG/DL
WBC # FLD AUTO: 8.53 K/UL

## 2023-04-21 RX ORDER — FERROUS SULFATE 325(65) MG
325 (65 FE) TABLET ORAL TWICE DAILY
Qty: 90 | Refills: 3 | Status: ACTIVE | COMMUNITY
Start: 2023-04-21 | End: 1900-01-01

## 2023-04-27 ENCOUNTER — NON-APPOINTMENT (OUTPATIENT)
Age: 28
End: 2023-04-27

## 2023-05-03 ENCOUNTER — APPOINTMENT (OUTPATIENT)
Dept: OBGYN | Facility: CLINIC | Age: 28
End: 2023-05-03
Payer: MEDICAID

## 2023-05-03 ENCOUNTER — OUTPATIENT (OUTPATIENT)
Dept: OUTPATIENT SERVICES | Facility: HOSPITAL | Age: 28
LOS: 1 days | End: 2023-05-03
Payer: MEDICAID

## 2023-05-03 VITALS — SYSTOLIC BLOOD PRESSURE: 110 MMHG | WEIGHT: 195 LBS | DIASTOLIC BLOOD PRESSURE: 60 MMHG | BODY MASS INDEX: 30.54 KG/M2

## 2023-05-03 DIAGNOSIS — K08.409 PARTIAL LOSS OF TEETH, UNSPECIFIED CAUSE, UNSPECIFIED CLASS: Chronic | ICD-10-CM

## 2023-05-03 DIAGNOSIS — Z34.90 ENCOUNTER FOR SUPERVISION OF NORMAL PREGNANCY, UNSPECIFIED, UNSPECIFIED TRIMESTER: ICD-10-CM

## 2023-05-03 LAB
BILIRUB UR QL STRIP: NORMAL
CLARITY UR: CLEAR
COLLECTION METHOD: NORMAL
GLUCOSE UR-MCNC: NORMAL
HCG UR QL: 0.2 EU/DL
HGB UR QL STRIP.AUTO: NORMAL
KETONES UR-MCNC: NORMAL
LEUKOCYTE ESTERASE UR QL STRIP: NORMAL
NITRITE UR QL STRIP: NORMAL
PH UR STRIP: 6.5
PROT UR STRIP-MCNC: NORMAL
SP GR UR STRIP: 1.01

## 2023-05-03 PROCEDURE — 81002 URINALYSIS NONAUTO W/O SCOPE: CPT

## 2023-05-03 PROCEDURE — 99213 OFFICE O/P EST LOW 20 MIN: CPT

## 2023-05-05 ENCOUNTER — APPOINTMENT (OUTPATIENT)
Dept: ANTEPARTUM | Facility: CLINIC | Age: 28
End: 2023-05-05
Payer: MEDICAID

## 2023-05-05 ENCOUNTER — OUTPATIENT (OUTPATIENT)
Dept: OUTPATIENT SERVICES | Facility: HOSPITAL | Age: 28
LOS: 1 days | End: 2023-05-05
Payer: MEDICAID

## 2023-05-05 ENCOUNTER — ASOB RESULT (OUTPATIENT)
Age: 28
End: 2023-05-05

## 2023-05-05 DIAGNOSIS — Z34.90 ENCOUNTER FOR SUPERVISION OF NORMAL PREGNANCY, UNSPECIFIED, UNSPECIFIED TRIMESTER: ICD-10-CM

## 2023-05-05 DIAGNOSIS — K08.409 PARTIAL LOSS OF TEETH, UNSPECIFIED CAUSE, UNSPECIFIED CLASS: Chronic | ICD-10-CM

## 2023-05-05 PROCEDURE — 76816 OB US FOLLOW-UP PER FETUS: CPT | Mod: 26

## 2023-05-05 PROCEDURE — 76816 OB US FOLLOW-UP PER FETUS: CPT

## 2023-05-09 DIAGNOSIS — Z36.4 ENCOUNTER FOR ANTENATAL SCREENING FOR FETAL GROWTH RETARDATION: ICD-10-CM

## 2023-05-09 DIAGNOSIS — O34.13 MATERNAL CARE FOR BENIGN TUMOR OF CORPUS UTERI, THIRD TRIMESTER: ICD-10-CM

## 2023-05-09 DIAGNOSIS — Z3A.32 32 WEEKS GESTATION OF PREGNANCY: ICD-10-CM

## 2023-05-17 ENCOUNTER — NON-APPOINTMENT (OUTPATIENT)
Age: 28
End: 2023-05-17

## 2023-05-17 ENCOUNTER — APPOINTMENT (OUTPATIENT)
Dept: OBGYN | Facility: CLINIC | Age: 28
End: 2023-05-17
Payer: MEDICAID

## 2023-05-17 ENCOUNTER — RESULT CHARGE (OUTPATIENT)
Age: 28
End: 2023-05-17

## 2023-05-17 ENCOUNTER — OUTPATIENT (OUTPATIENT)
Dept: OUTPATIENT SERVICES | Facility: HOSPITAL | Age: 28
LOS: 1 days | End: 2023-05-17
Payer: MEDICAID

## 2023-05-17 VITALS
DIASTOLIC BLOOD PRESSURE: 60 MMHG | BODY MASS INDEX: 31 KG/M2 | WEIGHT: 197.5 LBS | SYSTOLIC BLOOD PRESSURE: 112 MMHG | HEIGHT: 67 IN

## 2023-05-17 DIAGNOSIS — Z34.90 ENCOUNTER FOR SUPERVISION OF NORMAL PREGNANCY, UNSPECIFIED, UNSPECIFIED TRIMESTER: ICD-10-CM

## 2023-05-17 DIAGNOSIS — K08.409 PARTIAL LOSS OF TEETH, UNSPECIFIED CAUSE, UNSPECIFIED CLASS: Chronic | ICD-10-CM

## 2023-05-17 LAB
BILIRUB UR QL STRIP: NORMAL
CLARITY UR: CLEAR
COLLECTION METHOD: NORMAL
GLUCOSE UR-MCNC: NORMAL
HCG UR QL: 0.2 EU/DL
HGB UR QL STRIP.AUTO: NORMAL
KETONES UR-MCNC: NORMAL
LEUKOCYTE ESTERASE UR QL STRIP: NORMAL
NITRITE UR QL STRIP: NORMAL
PH UR STRIP: 5
PROT UR STRIP-MCNC: NORMAL
SP GR UR STRIP: 1.01

## 2023-05-17 PROCEDURE — 99213 OFFICE O/P EST LOW 20 MIN: CPT

## 2023-05-17 PROCEDURE — 81002 URINALYSIS NONAUTO W/O SCOPE: CPT

## 2023-05-25 ENCOUNTER — OUTPATIENT (OUTPATIENT)
Dept: OUTPATIENT SERVICES | Facility: HOSPITAL | Age: 28
LOS: 1 days | End: 2023-05-25
Payer: MEDICAID

## 2023-05-25 DIAGNOSIS — Z34.90 ENCOUNTER FOR SUPERVISION OF NORMAL PREGNANCY, UNSPECIFIED, UNSPECIFIED TRIMESTER: ICD-10-CM

## 2023-05-25 DIAGNOSIS — K08.409 PARTIAL LOSS OF TEETH, UNSPECIFIED CAUSE, UNSPECIFIED CLASS: Chronic | ICD-10-CM

## 2023-05-25 LAB
ABO + RH PNL BLD: NORMAL
BLD GP AB SCN SERPL QL: NORMAL

## 2023-05-25 PROCEDURE — 86850 RBC ANTIBODY SCREEN: CPT

## 2023-05-25 PROCEDURE — 85027 COMPLETE CBC AUTOMATED: CPT

## 2023-05-25 PROCEDURE — 86901 BLOOD TYPING SEROLOGIC RH(D): CPT

## 2023-05-25 PROCEDURE — 87389 HIV-1 AG W/HIV-1&-2 AB AG IA: CPT

## 2023-05-25 PROCEDURE — 86780 TREPONEMA PALLIDUM: CPT

## 2023-05-25 PROCEDURE — 86900 BLOOD TYPING SEROLOGIC ABO: CPT

## 2023-05-26 DIAGNOSIS — Z34.90 ENCOUNTER FOR SUPERVISION OF NORMAL PREGNANCY, UNSPECIFIED, UNSPECIFIED TRIMESTER: ICD-10-CM

## 2023-05-26 LAB
HIV1+2 AB SPEC QL IA.RAPID: NONREACTIVE
T PALLIDUM AB SER QL IA: NEGATIVE

## 2023-05-31 ENCOUNTER — NON-APPOINTMENT (OUTPATIENT)
Age: 28
End: 2023-05-31

## 2023-05-31 ENCOUNTER — APPOINTMENT (OUTPATIENT)
Dept: OBGYN | Facility: CLINIC | Age: 28
End: 2023-05-31
Payer: MEDICAID

## 2023-05-31 ENCOUNTER — OUTPATIENT (OUTPATIENT)
Dept: OUTPATIENT SERVICES | Facility: HOSPITAL | Age: 28
LOS: 1 days | End: 2023-05-31
Payer: MEDICAID

## 2023-05-31 ENCOUNTER — RESULT CHARGE (OUTPATIENT)
Age: 28
End: 2023-05-31

## 2023-05-31 VITALS — WEIGHT: 201 LBS | SYSTOLIC BLOOD PRESSURE: 110 MMHG | DIASTOLIC BLOOD PRESSURE: 60 MMHG | BODY MASS INDEX: 31.48 KG/M2

## 2023-05-31 DIAGNOSIS — Z34.90 ENCOUNTER FOR SUPERVISION OF NORMAL PREGNANCY, UNSPECIFIED, UNSPECIFIED TRIMESTER: ICD-10-CM

## 2023-05-31 DIAGNOSIS — K08.409 PARTIAL LOSS OF TEETH, UNSPECIFIED CAUSE, UNSPECIFIED CLASS: Chronic | ICD-10-CM

## 2023-05-31 LAB
BILIRUB UR QL STRIP: NORMAL
CLARITY UR: CLEAR
COLLECTION METHOD: NORMAL
GLUCOSE UR-MCNC: NORMAL
HCG UR QL: 0.2 EU/DL
HGB UR QL STRIP.AUTO: NORMAL
KETONES UR-MCNC: NORMAL
LEUKOCYTE ESTERASE UR QL STRIP: NORMAL
NITRITE UR QL STRIP: NORMAL
PH UR STRIP: 5
PROT UR STRIP-MCNC: NORMAL
SP GR UR STRIP: 1.02

## 2023-05-31 PROCEDURE — 87653 STREP B DNA AMP PROBE: CPT

## 2023-05-31 PROCEDURE — 99213 OFFICE O/P EST LOW 20 MIN: CPT

## 2023-05-31 PROCEDURE — 87591 N.GONORRHOEAE DNA AMP PROB: CPT

## 2023-05-31 PROCEDURE — 87491 CHLMYD TRACH DNA AMP PROBE: CPT

## 2023-05-31 PROCEDURE — 81002 URINALYSIS NONAUTO W/O SCOPE: CPT

## 2023-06-02 ENCOUNTER — ASOB RESULT (OUTPATIENT)
Age: 28
End: 2023-06-02

## 2023-06-02 ENCOUNTER — APPOINTMENT (OUTPATIENT)
Dept: ANTEPARTUM | Facility: CLINIC | Age: 28
End: 2023-06-02
Payer: MEDICAID

## 2023-06-02 ENCOUNTER — OUTPATIENT (OUTPATIENT)
Dept: OUTPATIENT SERVICES | Facility: HOSPITAL | Age: 28
LOS: 1 days | End: 2023-06-02
Payer: MEDICAID

## 2023-06-02 ENCOUNTER — NON-APPOINTMENT (OUTPATIENT)
Age: 28
End: 2023-06-02

## 2023-06-02 DIAGNOSIS — K08.409 PARTIAL LOSS OF TEETH, UNSPECIFIED CAUSE, UNSPECIFIED CLASS: Chronic | ICD-10-CM

## 2023-06-02 DIAGNOSIS — Z34.90 ENCOUNTER FOR SUPERVISION OF NORMAL PREGNANCY, UNSPECIFIED, UNSPECIFIED TRIMESTER: ICD-10-CM

## 2023-06-02 PROCEDURE — 76816 OB US FOLLOW-UP PER FETUS: CPT | Mod: 26

## 2023-06-02 PROCEDURE — 76816 OB US FOLLOW-UP PER FETUS: CPT

## 2023-06-02 PROCEDURE — 76819 FETAL BIOPHYS PROFIL W/O NST: CPT | Mod: 26,59

## 2023-06-02 PROCEDURE — 76819 FETAL BIOPHYS PROFIL W/O NST: CPT

## 2023-06-03 LAB
GP B STREP DNA SPEC QL NAA+PROBE: NOT DETECTED
SOURCE GBS: NORMAL

## 2023-06-06 DIAGNOSIS — Z3A.36 36 WEEKS GESTATION OF PREGNANCY: ICD-10-CM

## 2023-06-06 DIAGNOSIS — Z03.74 ENCOUNTER FOR SUSPECTED PROBLEM WITH FETAL GROWTH RULED OUT: ICD-10-CM

## 2023-06-06 DIAGNOSIS — O36.63X0 MATERNAL CARE FOR EXCESSIVE FETAL GROWTH, THIRD TRIMESTER, NOT APPLICABLE OR UNSPECIFIED: ICD-10-CM

## 2023-06-06 DIAGNOSIS — O34.13 MATERNAL CARE FOR BENIGN TUMOR OF CORPUS UTERI, THIRD TRIMESTER: ICD-10-CM

## 2023-06-07 ENCOUNTER — RESULT CHARGE (OUTPATIENT)
Age: 28
End: 2023-06-07

## 2023-06-07 ENCOUNTER — NON-APPOINTMENT (OUTPATIENT)
Age: 28
End: 2023-06-07

## 2023-06-07 ENCOUNTER — APPOINTMENT (OUTPATIENT)
Dept: OBGYN | Facility: CLINIC | Age: 28
End: 2023-06-07
Payer: MEDICAID

## 2023-06-07 ENCOUNTER — OUTPATIENT (OUTPATIENT)
Dept: OUTPATIENT SERVICES | Facility: HOSPITAL | Age: 28
LOS: 1 days | End: 2023-06-07
Payer: MEDICAID

## 2023-06-07 VITALS — WEIGHT: 201 LBS | SYSTOLIC BLOOD PRESSURE: 106 MMHG | DIASTOLIC BLOOD PRESSURE: 69 MMHG | BODY MASS INDEX: 31.48 KG/M2

## 2023-06-07 DIAGNOSIS — K08.409 PARTIAL LOSS OF TEETH, UNSPECIFIED CAUSE, UNSPECIFIED CLASS: Chronic | ICD-10-CM

## 2023-06-07 DIAGNOSIS — Z34.90 ENCOUNTER FOR SUPERVISION OF NORMAL PREGNANCY, UNSPECIFIED, UNSPECIFIED TRIMESTER: ICD-10-CM

## 2023-06-07 PROCEDURE — 99213 OFFICE O/P EST LOW 20 MIN: CPT

## 2023-06-07 PROCEDURE — 81002 URINALYSIS NONAUTO W/O SCOPE: CPT

## 2023-06-07 RX ORDER — CHLORHEXIDINE GLUCONATE 4 %
325 (65 FE) LIQUID (ML) TOPICAL 3 TIMES DAILY
Qty: 90 | Refills: 0 | Status: ACTIVE | COMMUNITY
Start: 2023-06-07 | End: 1900-01-01

## 2023-06-14 ENCOUNTER — RESULT CHARGE (OUTPATIENT)
Age: 28
End: 2023-06-14

## 2023-06-14 ENCOUNTER — APPOINTMENT (OUTPATIENT)
Dept: OBGYN | Facility: CLINIC | Age: 28
End: 2023-06-14
Payer: MEDICAID

## 2023-06-14 ENCOUNTER — OUTPATIENT (OUTPATIENT)
Dept: OUTPATIENT SERVICES | Facility: HOSPITAL | Age: 28
LOS: 1 days | End: 2023-06-14
Payer: MEDICAID

## 2023-06-14 VITALS
WEIGHT: 199 LBS | DIASTOLIC BLOOD PRESSURE: 70 MMHG | SYSTOLIC BLOOD PRESSURE: 112 MMHG | HEIGHT: 67 IN | BODY MASS INDEX: 31.23 KG/M2

## 2023-06-14 DIAGNOSIS — Z34.90 ENCOUNTER FOR SUPERVISION OF NORMAL PREGNANCY, UNSPECIFIED, UNSPECIFIED TRIMESTER: ICD-10-CM

## 2023-06-14 DIAGNOSIS — K08.409 PARTIAL LOSS OF TEETH, UNSPECIFIED CAUSE, UNSPECIFIED CLASS: Chronic | ICD-10-CM

## 2023-06-14 PROCEDURE — 81002 URINALYSIS NONAUTO W/O SCOPE: CPT

## 2023-06-14 PROCEDURE — 99213 OFFICE O/P EST LOW 20 MIN: CPT

## 2023-06-15 DIAGNOSIS — Z34.90 ENCOUNTER FOR SUPERVISION OF NORMAL PREGNANCY, UNSPECIFIED, UNSPECIFIED TRIMESTER: ICD-10-CM

## 2023-06-21 ENCOUNTER — OUTPATIENT (OUTPATIENT)
Dept: OUTPATIENT SERVICES | Facility: HOSPITAL | Age: 28
LOS: 1 days | End: 2023-06-21
Payer: MEDICAID

## 2023-06-21 ENCOUNTER — RESULT CHARGE (OUTPATIENT)
Age: 28
End: 2023-06-21

## 2023-06-21 ENCOUNTER — APPOINTMENT (OUTPATIENT)
Dept: OBGYN | Facility: CLINIC | Age: 28
End: 2023-06-21
Payer: MEDICAID

## 2023-06-21 VITALS
SYSTOLIC BLOOD PRESSURE: 114 MMHG | BODY MASS INDEX: 31.39 KG/M2 | HEIGHT: 67 IN | DIASTOLIC BLOOD PRESSURE: 78 MMHG | WEIGHT: 200 LBS

## 2023-06-21 DIAGNOSIS — Z34.90 ENCOUNTER FOR SUPERVISION OF NORMAL PREGNANCY, UNSPECIFIED, UNSPECIFIED TRIMESTER: ICD-10-CM

## 2023-06-21 DIAGNOSIS — K08.409 PARTIAL LOSS OF TEETH, UNSPECIFIED CAUSE, UNSPECIFIED CLASS: Chronic | ICD-10-CM

## 2023-06-21 PROCEDURE — 99213 OFFICE O/P EST LOW 20 MIN: CPT

## 2023-06-21 PROCEDURE — 81002 URINALYSIS NONAUTO W/O SCOPE: CPT

## 2023-06-21 PROCEDURE — 99212 OFFICE O/P EST SF 10 MIN: CPT

## 2023-06-22 DIAGNOSIS — Z34.90 ENCOUNTER FOR SUPERVISION OF NORMAL PREGNANCY, UNSPECIFIED, UNSPECIFIED TRIMESTER: ICD-10-CM

## 2023-06-28 ENCOUNTER — APPOINTMENT (OUTPATIENT)
Dept: ANTEPARTUM | Facility: CLINIC | Age: 28
End: 2023-06-28
Payer: MEDICAID

## 2023-06-28 ENCOUNTER — APPOINTMENT (OUTPATIENT)
Dept: OBGYN | Facility: CLINIC | Age: 28
End: 2023-06-28
Payer: MEDICAID

## 2023-06-28 ENCOUNTER — OUTPATIENT (OUTPATIENT)
Dept: OUTPATIENT SERVICES | Facility: HOSPITAL | Age: 28
LOS: 1 days | End: 2023-06-28
Payer: MEDICAID

## 2023-06-28 ENCOUNTER — ASOB RESULT (OUTPATIENT)
Age: 28
End: 2023-06-28

## 2023-06-28 VITALS — BODY MASS INDEX: 31.64 KG/M2 | WEIGHT: 202 LBS | DIASTOLIC BLOOD PRESSURE: 82 MMHG | SYSTOLIC BLOOD PRESSURE: 106 MMHG

## 2023-06-28 VITALS — HEART RATE: 104 BPM | DIASTOLIC BLOOD PRESSURE: 69 MMHG | SYSTOLIC BLOOD PRESSURE: 112 MMHG

## 2023-06-28 DIAGNOSIS — Z34.03 ENCOUNTER FOR SUPERVISION OF NORMAL FIRST PREGNANCY, THIRD TRIMESTER: ICD-10-CM

## 2023-06-28 DIAGNOSIS — K08.409 PARTIAL LOSS OF TEETH, UNSPECIFIED CAUSE, UNSPECIFIED CLASS: Chronic | ICD-10-CM

## 2023-06-28 DIAGNOSIS — Z34.90 ENCOUNTER FOR SUPERVISION OF NORMAL PREGNANCY, UNSPECIFIED, UNSPECIFIED TRIMESTER: ICD-10-CM

## 2023-06-28 PROCEDURE — 76816 OB US FOLLOW-UP PER FETUS: CPT

## 2023-06-28 PROCEDURE — 76818 FETAL BIOPHYS PROFILE W/NST: CPT | Mod: 26

## 2023-06-28 PROCEDURE — 99213 OFFICE O/P EST LOW 20 MIN: CPT

## 2023-06-28 PROCEDURE — 76816 OB US FOLLOW-UP PER FETUS: CPT | Mod: 26

## 2023-06-28 PROCEDURE — ZZZZZ: CPT

## 2023-06-28 PROCEDURE — 76818 FETAL BIOPHYS PROFILE W/NST: CPT

## 2023-06-28 PROCEDURE — 81002 URINALYSIS NONAUTO W/O SCOPE: CPT

## 2023-06-29 DIAGNOSIS — O36.63X0 MATERNAL CARE FOR EXCESSIVE FETAL GROWTH, THIRD TRIMESTER, NOT APPLICABLE OR UNSPECIFIED: ICD-10-CM

## 2023-06-29 DIAGNOSIS — O34.13 MATERNAL CARE FOR BENIGN TUMOR OF CORPUS UTERI, THIRD TRIMESTER: ICD-10-CM

## 2023-06-30 ENCOUNTER — NON-APPOINTMENT (OUTPATIENT)
Age: 28
End: 2023-06-30

## 2023-07-01 ENCOUNTER — NON-APPOINTMENT (OUTPATIENT)
Age: 28
End: 2023-07-01

## 2023-07-01 ENCOUNTER — INPATIENT (INPATIENT)
Facility: HOSPITAL | Age: 28
LOS: 1 days | Discharge: HOME | DRG: 560 | End: 2023-07-03
Attending: OBSTETRICS & GYNECOLOGY | Admitting: OBSTETRICS & GYNECOLOGY
Payer: MEDICAID

## 2023-07-01 DIAGNOSIS — O47.1 FALSE LABOR AT OR AFTER 37 COMPLETED WEEKS OF GESTATION: ICD-10-CM

## 2023-07-01 DIAGNOSIS — K08.409 PARTIAL LOSS OF TEETH, UNSPECIFIED CAUSE, UNSPECIFIED CLASS: Chronic | ICD-10-CM

## 2023-07-01 DIAGNOSIS — O26.899 OTHER SPECIFIED PREGNANCY RELATED CONDITIONS, UNSPECIFIED TRIMESTER: ICD-10-CM

## 2023-07-01 DIAGNOSIS — Z3A.00 WEEKS OF GESTATION OF PREGNANCY NOT SPECIFIED: ICD-10-CM

## 2023-07-01 LAB
APPEARANCE UR: CLEAR — SIGNIFICANT CHANGE UP
BASOPHILS # BLD AUTO: 0.03 K/UL — SIGNIFICANT CHANGE UP (ref 0–0.2)
BASOPHILS NFR BLD AUTO: 0.3 % — SIGNIFICANT CHANGE UP (ref 0–1)
BILIRUB UR-MCNC: NEGATIVE — SIGNIFICANT CHANGE UP
COLOR SPEC: SIGNIFICANT CHANGE UP
DIFF PNL FLD: ABNORMAL
EOSINOPHIL # BLD AUTO: 0.12 K/UL — SIGNIFICANT CHANGE UP (ref 0–0.7)
EOSINOPHIL NFR BLD AUTO: 1.2 % — SIGNIFICANT CHANGE UP (ref 0–8)
GLUCOSE UR QL: NEGATIVE — SIGNIFICANT CHANGE UP
HCT VFR BLD CALC: 33.7 % — LOW (ref 37–47)
HGB BLD-MCNC: 10.7 G/DL — LOW (ref 12–16)
IMM GRANULOCYTES NFR BLD AUTO: 0.4 % — HIGH (ref 0.1–0.3)
KETONES UR-MCNC: NEGATIVE — SIGNIFICANT CHANGE UP
L&D DRUG SCREEN, URINE: SIGNIFICANT CHANGE UP
LEUKOCYTE ESTERASE UR-ACNC: ABNORMAL
LYMPHOCYTES # BLD AUTO: 1.79 K/UL — SIGNIFICANT CHANGE UP (ref 1.2–3.4)
LYMPHOCYTES # BLD AUTO: 18.1 % — LOW (ref 20.5–51.1)
MCHC RBC-ENTMCNC: 23.2 PG — LOW (ref 27–31)
MCHC RBC-ENTMCNC: 31.8 G/DL — LOW (ref 32–37)
MCV RBC AUTO: 73.1 FL — LOW (ref 81–99)
MONOCYTES # BLD AUTO: 0.81 K/UL — HIGH (ref 0.1–0.6)
MONOCYTES NFR BLD AUTO: 8.2 % — SIGNIFICANT CHANGE UP (ref 1.7–9.3)
NEUTROPHILS # BLD AUTO: 7.09 K/UL — HIGH (ref 1.4–6.5)
NEUTROPHILS NFR BLD AUTO: 71.8 % — SIGNIFICANT CHANGE UP (ref 42.2–75.2)
NITRITE UR-MCNC: NEGATIVE — SIGNIFICANT CHANGE UP
NRBC # BLD: 0 /100 WBCS — SIGNIFICANT CHANGE UP (ref 0–0)
PH UR: 7.5 — SIGNIFICANT CHANGE UP (ref 5–8)
PLATELET # BLD AUTO: 163 K/UL — SIGNIFICANT CHANGE UP (ref 130–400)
PMV BLD: SIGNIFICANT CHANGE UP (ref 7.4–10.4)
PRENATAL SYPHILIS TEST: SIGNIFICANT CHANGE UP
PROT UR-MCNC: SIGNIFICANT CHANGE UP
RBC # BLD: 4.61 M/UL — SIGNIFICANT CHANGE UP (ref 4.2–5.4)
RBC # FLD: 15.4 % — HIGH (ref 11.5–14.5)
SP GR SPEC: 1.01 — SIGNIFICANT CHANGE UP (ref 1.01–1.03)
UROBILINOGEN FLD QL: SIGNIFICANT CHANGE UP
WBC # BLD: 9.88 K/UL — SIGNIFICANT CHANGE UP (ref 4.8–10.8)
WBC # FLD AUTO: 9.88 K/UL — SIGNIFICANT CHANGE UP (ref 4.8–10.8)

## 2023-07-01 PROCEDURE — 86769 SARS-COV-2 COVID-19 ANTIBODY: CPT

## 2023-07-01 PROCEDURE — 85025 COMPLETE CBC W/AUTO DIFF WBC: CPT

## 2023-07-01 PROCEDURE — 80307 DRUG TEST PRSMV CHEM ANLYZR: CPT

## 2023-07-01 PROCEDURE — 81001 URINALYSIS AUTO W/SCOPE: CPT

## 2023-07-01 PROCEDURE — 59050 FETAL MONITOR W/REPORT: CPT

## 2023-07-01 PROCEDURE — 36415 COLL VENOUS BLD VENIPUNCTURE: CPT

## 2023-07-01 PROCEDURE — 86900 BLOOD TYPING SEROLOGIC ABO: CPT

## 2023-07-01 PROCEDURE — 86850 RBC ANTIBODY SCREEN: CPT

## 2023-07-01 PROCEDURE — 86901 BLOOD TYPING SEROLOGIC RH(D): CPT

## 2023-07-01 PROCEDURE — 86592 SYPHILIS TEST NON-TREP QUAL: CPT

## 2023-07-01 PROCEDURE — 80354 DRUG SCREENING FENTANYL: CPT

## 2023-07-01 RX ORDER — SODIUM CHLORIDE 9 MG/ML
1000 INJECTION, SOLUTION INTRAVENOUS
Refills: 0 | Status: DISCONTINUED | OUTPATIENT
Start: 2023-07-01 | End: 2023-07-02

## 2023-07-01 RX ORDER — FENTANYL/BUPIVACAINE/NS/PF 2MCG/ML-.1
250 PLASTIC BAG, INJECTION (ML) INJECTION
Refills: 0 | Status: DISCONTINUED | OUTPATIENT
Start: 2023-07-01 | End: 2023-07-02

## 2023-07-01 RX ORDER — OXYTOCIN 10 UNIT/ML
2 VIAL (ML) INJECTION
Qty: 30 | Refills: 0 | Status: DISCONTINUED | OUTPATIENT
Start: 2023-07-01 | End: 2023-07-02

## 2023-07-01 RX ORDER — OXYTOCIN 10 UNIT/ML
333.33 VIAL (ML) INJECTION
Qty: 20 | Refills: 0 | Status: DISCONTINUED | OUTPATIENT
Start: 2023-07-01 | End: 2023-07-02

## 2023-07-01 RX ORDER — DEXAMETHASONE 0.5 MG/5ML
4 ELIXIR ORAL EVERY 6 HOURS
Refills: 0 | Status: DISCONTINUED | OUTPATIENT
Start: 2023-07-01 | End: 2023-07-02

## 2023-07-01 RX ORDER — NALOXONE HYDROCHLORIDE 4 MG/.1ML
0.1 SPRAY NASAL
Refills: 0 | Status: DISCONTINUED | OUTPATIENT
Start: 2023-07-01 | End: 2023-07-02

## 2023-07-01 RX ORDER — ONDANSETRON 8 MG/1
4 TABLET, FILM COATED ORAL EVERY 6 HOURS
Refills: 0 | Status: DISCONTINUED | OUTPATIENT
Start: 2023-07-01 | End: 2023-07-02

## 2023-07-01 RX ADMIN — Medication 2 MILLIUNIT(S)/MIN: at 17:03

## 2023-07-01 RX ADMIN — SODIUM CHLORIDE 125 MILLILITER(S): 9 INJECTION, SOLUTION INTRAVENOUS at 17:03

## 2023-07-01 NOTE — OB PROVIDER H&P - NSHPPHYSICALEXAM_GEN_ALL_CORE
Physical exam:    Vital Signs Last 24 Hrs  T(F): 98.2 (01 Jul 2023 13:54), Max: 98.2 (01 Jul 2023 13:03)  HR: 102 (01 Jul 2023 13:58) (102 - 104)  BP: 112/75 (01 Jul 2023 13:58) (112/75 - 118/77)  RR: 16 (01 Jul 2023 13:54) (16 - 16)    Gen: AAOx3, NAD  Abdomen: Soft, nontender, no distension, gravid, palpable contractions  Ext: No calf tenderness, no swelling    EFM: 130/mod silvia/pos accel  toco: q5 min  Speculum: light amount of pooling, nitrazine positive, ferning negative, no bleeding  SVE: 3/80/-2 vertex, ruptured  BSS: cephalic, MVP 3.66cm

## 2023-07-01 NOTE — OB PROVIDER H&P - ATTENDING COMMENTS
29 y/o  @ 40w3d with SROM, in early labor. Admit to labor and delivery. Continuous EFM and toco. GBS negative. Monitor vital signs. Monitor for labor progress.

## 2023-07-01 NOTE — OB RN DELIVERY SUMMARY - NSSELHIDDEN_OBGYN_ALL_OB_FT
[NS_[email or web address]_ALL_OB_FT:[id number]],[NS_DeliveryAssist1_OBGYN_ALL_OB_FT:[id number]],[NS_DeliveryRN_OBGYN_ALL_OB_FT:[id number]],[NS_CirculateRN2_OBGYN_ALL_OB_FT:[id number]]

## 2023-07-01 NOTE — OB PROVIDER H&P - NSLOWPPHRISK_OBGYN_A_OB
No previous uterine incision/Romero Pregnancy/Less than or equal to 4 previous vaginal births/No known bleeding disorder/No history of postpartum hemorrhage/No other PPH risks indicated

## 2023-07-01 NOTE — OB RN PATIENT PROFILE - FALL HARM RISK - UNIVERSAL INTERVENTIONS
Bed in lowest position, wheels locked, appropriate side rails in place/Call bell, personal items and telephone in reach/Instruct patient to call for assistance before getting out of bed or chair/Non-slip footwear when patient is out of bed/[place] to call system/Physically safe environment - no spills, clutter or unnecessary equipment/Purposeful Proactive Rounding/Room/bathroom lighting operational, light cord in reach

## 2023-07-01 NOTE — PROGRESS NOTE ADULT - ASSESSMENT
A/P: 27yo  at 40w3d, GBS neg, with unexplained proteinuria in antepartum periods, EFW 93%ile, s/p breast bx for benign fibroadenoma, SROM @1220 and in labor.    -cont pitocin currently 16mu/min  -pain management prn  -cont efm/toco  -cont to monitor vitals  -cont iv hydration    Dr. Ayala and Dr. Jones

## 2023-07-01 NOTE — OB PROVIDER H&P - NSICDXPASTMEDICALHX_GEN_ALL_CORE_FT
Health Maintenance Due   Topic Date Due   • DTaP/Tdap/Td Vaccine (1 - Tdap) 03/16/2008   • Cervical Cancer Screening  03/16/2010       Patient is due for topics listed above, she wishes to proceed with Immunization(s) Dtap/Tdap/Td, but will discuss proceeding with Cervical cancer screening at this time.              PAST MEDICAL HISTORY:  Fibroadenoma, right

## 2023-07-01 NOTE — PROGRESS NOTE ADULT - ASSESSMENT
A/P: 29yo  at 40w3d, GBS neg, with unexplained proteinuria in antepartum periods, EFW 93%ile, s/p breast bx for benign fibroadenoma, SROM @1220 and in labor.    -start pitocin for labor augmentation   -pain management prn  -cont efm/toco  -cont to monitor vitals  -cont iv hydration    Dr. Ayala and Dr. Jones aware

## 2023-07-01 NOTE — PROCEDURE NOTE - NSLOADDOSE_OBGYN_ALL_OB
15mcg fentanyl administered via spinal, 85mcg administered via epidural/10 ML of 0.25 percent Bupivicaine/Other, please specify

## 2023-07-01 NOTE — OB PROVIDER H&P - NSHPLABSRESULTS_GEN_ALL_CORE
05/31  GC/Chlam neg  GBS neg  05/25  HIV AG/AB nonreactive  syphillis neg  04/21  protein/Cr 0.7  24 protein 522  PT/PTT 10.90/26.5    Uric 5.1  04/14    GTT 91 166 145 78  12/10  MMRV IgG Positive  HBsAg Nonreactive  HCsAb Nonreactive      (06/28) Vertex, anterior, MVP 3.7 EFW 4304 (93%)  (06/02) Vertex, anterior, EFW 3244 (84%)  (05/05) Cephalic, anterior, EFW 2192 (76%)  (02/10) Breech, anterior,  05/31  GC/Chlam neg  GBS neg    05/25  HIV AG/AB nonreactive  syphillis neg    04/21  protein/Cr 0.7  24 hr protein 522  PT/PTT 10.90/26.5    Uric 5.1    04/14    GTT 91 166 145 78  12/10  MMRV IgG Positive  HBsAg Nonreactive  HCsAb Nonreactive    Sonos:   (06/28) Vertex, anterior placenta, MVP 3.7 EFW 4304 (93%)  (06/02) Vertex, anterior, EFW 3244 (84%)  (05/05) Cephalic, anterior, EFW 2192 (76%)  (02/10) Breech, anterior,

## 2023-07-01 NOTE — OB RN TRIAGE NOTE - FALL HARM RISK - UNIVERSAL INTERVENTIONS
Bed in lowest position, wheels locked, appropriate side rails in place/Call bell, personal items and telephone in reach/Instruct patient to call for assistance before getting out of bed or chair/Non-slip footwear when patient is out of bed/[place] to call system/Physically safe environment - no spills, clutter or unnecessary equipment/Purposeful Proactive Rounding/Room/bathroom lighting operational, light cord in reach

## 2023-07-01 NOTE — PROGRESS NOTE ADULT - ASSESSMENT
A/P: 29yo  at 40w3d, GBS neg, with unexplained proteinuria in antepartum periods, EFW 93%ile, s/p breast bx for benign fibroadenoma, SROM @1220 and in labor.    -cont pitocin currently 14mu/min  -pain management prn  -cont efm/toco  -cont to monitor vitals  -cont iv hydration    Dr. Ayala and Dr. Jones aware

## 2023-07-01 NOTE — PRE-ANESTHESIA EVALUATION ADULT - NSANTHPMHFT_GEN_ALL_CORE
28y  at 40w3d, LETA 23 by LMP (22) c/w first trimester sonogram, presenting to L&D with contractions and leakage of fluid. Patient states ctx began yesterday @0400 and has been increasing in severity and frequency. Are now 8/10 intensity and every 5 minutes. She also states she broke her water today at 1220 when she noted trickling of fluid down her legs. Patient went to the toilet and noted pinkish-clear discharge when she wiped. Denies vaginal bleeding. Reports good FM. SVE on , 2cm dilated. GBS negative.   Prenatal course complicated by   - Proteinuria. Office BPs 105s-120s/80s and prenatal PELs were negative.  - On , EFW 93%ile and AC >99%ile  - S/p breast biopsy in February for benign fibroadenoma

## 2023-07-01 NOTE — OB PROVIDER IHI INDUCTION/AUGMENTATION NOTE - LABOR: FETAL STATION
DISCHARGE PLANNING    • Discharge to home or other facility with appropriate resources Progressing        GASTROINTESTINAL - ADULT    • Minimal or absence of nausea and vomiting Progressing    • Maintains or returns to baseline bowel function Progressing
-2

## 2023-07-01 NOTE — PROCEDURE NOTE - ADDITIONAL PROCEDURE DETAILS
Thorough discussion of patient's history was had with patient. Discussed risks of epidural/spinal, including PDPH, inadequate analgesia occasionally requiring epidural catheter replacement/general anesthesia, bleeding, infection, spinal cord injury, hypotension, and nausea. Patient expressed understanding of these risks, signed informed consent, and wishes to proceed with the procedure.    Patient sitting. Lumbar epidural performed. Standard ASA monitors including FHR used. Sterile gloves, Chloraprep used. 1% lidocaine was used for local infiltration. 17g Tuohy used to achieve SHARIFA at 7 cm. 27g Fabio used to make a dural puncture for CSF confirmation. Fabio needle removed easily. Catheter secured in place at 13 cm. Tuohy needle removed. Negative aspiration. Test dose consisting of 3cc 1.5% lidocaine with epinephrine was negative. Patient tolerated procedure and was hemodynamically stable throughout. Thorough discussion of patient's history was had with patient. Discussed risks of epidural/spinal, including PDPH, inadequate analgesia occasionally requiring epidural catheter replacement/general anesthesia, bleeding, infection, spinal cord injury, hypotension, and nausea. Patient expressed understanding of these risks, signed informed consent, and wishes to proceed with the procedure.    Patient sitting. Lumbar epidural performed. Standard ASA monitors including FHR used. Sterile gloves, Chloraprep used. 1% lidocaine was used for local infiltration. 17g Tuohy used to achieve SHARIFA at 7 cm. 27g Fabio used to make a dural puncture for CSF confirmation. Fabio needle removed easily. Catheter secured in place at 13 cm. Tuohy needle removed. Negative aspiration. Test dose consisting of 3cc 1.5% lidocaine with epinephrine was negative. Patient tolerated procedure and was hemodynamically stable throughout.    Post Procedure/ Top OFF Patient evaluated at bedside.  Hemodynamically stable, degree of motor block appropriate for epidural medication administered. Pain is well controlled bilaterally. Patient is aware that the anesthesia team is available 24/7, in case she needs more pain medication or has any other anesthesia-related needs or questions.     .0625% Bupivacaine +2ucg/cc Fentanyl epidural PCEA 10/5/15    Top Off .25% Bupivacaine 5 ML

## 2023-07-01 NOTE — OB PROVIDER H&P - HISTORY OF PRESENT ILLNESS
28y  at [id number] presenting for labor and ROM. Patient states ctx began yesterday @0400 and has been increasing in severity and frequency. She also states she broke her water today at 1220 when she noted trickling of fluid down her legs. Patient went to the toilet and noted pinkish-clear discharge when she wiped.    +LOF, no VB, CTXs q4-5min, + Fetal movement .  Prenatal course complicated by proteinuria. Office BPs 105s-120s/80s and prenatal PELs were negative  Prenatal course otherwise uncomplicated       28y  at 40w3d, LETA 23 by LMP (22) c/w first trimester sonogram, presenting to L&D with contractions and leakage of fluid. Patient states ctx began yesterday @0400 and has been increasing in severity and frequency. Are now 8/10 intensity and every 5 minutes. She also states she broke her water today at 1220 when she noted trickling of fluid down her legs. Patient went to the toilet and noted pinkish-clear discharge when she wiped. Denies vaginal bleeding. Reports good FM. SVE on , 2cm dilated. GBS negative.   Prenatal course complicated by   - Proteinuria. Office BPs 105s-120s/80s and prenatal PELs were negative.  - On , EFW 93%ile and AC >99%ile  - S/p breast biopsy in February for benign fibroadenoma

## 2023-07-01 NOTE — OB RN DELIVERY SUMMARY - AMNIOTIC FLUID AMOUNT, LABOR
[Dear  ___] : Dear  [unfilled], [Consult Letter:] : I had the pleasure of evaluating your patient, [unfilled]. [Consult Closing:] : Thank you very much for allowing me to participate in the care of this patient.  If you have any questions, please do not hesitate to contact me. [Sincerely,] : Sincerely, [FreeTextEntry2] : Dr. Calvin Mooney \[address], \[place] [address] [FreeTextEntry3] : Mina Pandya MD  within normal limits

## 2023-07-01 NOTE — OB PROVIDER H&P - ASSESSMENT
27yo  at 40w3d, GBS neg, no complications during pregnancy, in labor.  -Admit to L+D  -Monitor vitals  -Monitor EFM and TOCO   -IVF and admission labs  -Pain control PRN, patient desires epidural  -Clear liquid diet as tolerated    Dr. Ayala at bedside. Discussed with Dr. Jones.            29yo  at 40w3d, GBS neg, with unexplained proteinuria, EFW 93%ile, s/p breast bx for benign fibroadenoma, SROM @1220 and in labor.  -Admit to L+D  -Monitor vitals  -Monitor EFM and TOCO   -IVF and admission labs  -Pain control PRN, patient desires epidural  -Clear liquid diet as tolerated    Dr. Ayala at bedside. Discussed with Dr. Jones.            27yo  at 40w3d, GBS neg, with unexplained proteinuria in antepartum periods, EFW 93%ile, s/p breast bx for benign fibroadenoma, SROM @1220 and in labor.  -Admit to L+D  -Monitor vitals  -Monitor EFM and TOCO   -IVF and admission labs  -Pain control PRN, patient desires epidural  -Clear liquid diet as tolerated    Dr. Ayala at bedside. Discussed with Dr. Jones.

## 2023-07-01 NOTE — OB PROVIDER H&P - NS_OBGYNHISTORY_OBGYN_ALL_OB_FT
OBHx:    Gyn Hx:  No hx of fibroids or cysts  No hx of STIs OBHx:   iTOP x1, medical    Gyn Hx:   No hx of fibroids, cysts, STIs, or abnormal pap smears

## 2023-07-02 LAB — BLD GP AB SCN SERPL QL: SIGNIFICANT CHANGE UP

## 2023-07-02 PROCEDURE — 59409 OBSTETRICAL CARE: CPT | Mod: U9

## 2023-07-02 RX ORDER — ACETAMINOPHEN 500 MG
975 TABLET ORAL
Refills: 0 | Status: DISCONTINUED | OUTPATIENT
Start: 2023-07-02 | End: 2023-07-03

## 2023-07-02 RX ORDER — SIMETHICONE 80 MG/1
80 TABLET, CHEWABLE ORAL EVERY 4 HOURS
Refills: 0 | Status: DISCONTINUED | OUTPATIENT
Start: 2023-07-02 | End: 2023-07-03

## 2023-07-02 RX ORDER — BENZOCAINE 10 %
1 GEL (GRAM) MUCOUS MEMBRANE EVERY 6 HOURS
Refills: 0 | Status: DISCONTINUED | OUTPATIENT
Start: 2023-07-02 | End: 2023-07-03

## 2023-07-02 RX ORDER — OXYTOCIN 10 UNIT/ML
41.67 VIAL (ML) INJECTION
Qty: 20 | Refills: 0 | Status: DISCONTINUED | OUTPATIENT
Start: 2023-07-02 | End: 2023-07-03

## 2023-07-02 RX ORDER — OXYCODONE HYDROCHLORIDE 5 MG/1
5 TABLET ORAL
Refills: 0 | Status: DISCONTINUED | OUTPATIENT
Start: 2023-07-02 | End: 2023-07-03

## 2023-07-02 RX ORDER — KETOROLAC TROMETHAMINE 30 MG/ML
30 SYRINGE (ML) INJECTION ONCE
Refills: 0 | Status: DISCONTINUED | OUTPATIENT
Start: 2023-07-02 | End: 2023-07-03

## 2023-07-02 RX ORDER — LANOLIN
1 OINTMENT (GRAM) TOPICAL EVERY 6 HOURS
Refills: 0 | Status: DISCONTINUED | OUTPATIENT
Start: 2023-07-02 | End: 2023-07-03

## 2023-07-02 RX ORDER — IBUPROFEN 200 MG
600 TABLET ORAL EVERY 6 HOURS
Refills: 0 | Status: COMPLETED | OUTPATIENT
Start: 2023-07-02 | End: 2024-05-30

## 2023-07-02 RX ORDER — TETANUS TOXOID, REDUCED DIPHTHERIA TOXOID AND ACELLULAR PERTUSSIS VACCINE, ADSORBED 5; 2.5; 8; 8; 2.5 [IU]/.5ML; [IU]/.5ML; UG/.5ML; UG/.5ML; UG/.5ML
0.5 SUSPENSION INTRAMUSCULAR ONCE
Refills: 0 | Status: DISCONTINUED | OUTPATIENT
Start: 2023-07-02 | End: 2023-07-03

## 2023-07-02 RX ORDER — HYDROCORTISONE 1 %
1 OINTMENT (GRAM) TOPICAL EVERY 6 HOURS
Refills: 0 | Status: DISCONTINUED | OUTPATIENT
Start: 2023-07-02 | End: 2023-07-03

## 2023-07-02 RX ORDER — PRAMOXINE HYDROCHLORIDE 150 MG/15G
1 AEROSOL, FOAM RECTAL EVERY 4 HOURS
Refills: 0 | Status: DISCONTINUED | OUTPATIENT
Start: 2023-07-02 | End: 2023-07-03

## 2023-07-02 RX ORDER — MAGNESIUM HYDROXIDE 400 MG/1
30 TABLET, CHEWABLE ORAL
Refills: 0 | Status: DISCONTINUED | OUTPATIENT
Start: 2023-07-02 | End: 2023-07-03

## 2023-07-02 RX ORDER — DIPHENHYDRAMINE HCL 50 MG
25 CAPSULE ORAL EVERY 6 HOURS
Refills: 0 | Status: DISCONTINUED | OUTPATIENT
Start: 2023-07-02 | End: 2023-07-03

## 2023-07-02 RX ORDER — IBUPROFEN 200 MG
600 TABLET ORAL EVERY 6 HOURS
Refills: 0 | Status: DISCONTINUED | OUTPATIENT
Start: 2023-07-02 | End: 2023-07-03

## 2023-07-02 RX ORDER — DIBUCAINE 1 %
1 OINTMENT (GRAM) RECTAL EVERY 6 HOURS
Refills: 0 | Status: DISCONTINUED | OUTPATIENT
Start: 2023-07-02 | End: 2023-07-03

## 2023-07-02 RX ORDER — OXYCODONE HYDROCHLORIDE 5 MG/1
5 TABLET ORAL ONCE
Refills: 0 | Status: DISCONTINUED | OUTPATIENT
Start: 2023-07-02 | End: 2023-07-03

## 2023-07-02 RX ORDER — AER TRAVELER 0.5 G/1
1 SOLUTION RECTAL; TOPICAL EVERY 4 HOURS
Refills: 0 | Status: DISCONTINUED | OUTPATIENT
Start: 2023-07-02 | End: 2023-07-03

## 2023-07-02 RX ORDER — SODIUM CHLORIDE 9 MG/ML
3 INJECTION INTRAMUSCULAR; INTRAVENOUS; SUBCUTANEOUS EVERY 8 HOURS
Refills: 0 | Status: DISCONTINUED | OUTPATIENT
Start: 2023-07-02 | End: 2023-07-03

## 2023-07-02 RX ADMIN — Medication 975 MILLIGRAM(S): at 21:45

## 2023-07-02 RX ADMIN — Medication 975 MILLIGRAM(S): at 20:46

## 2023-07-02 RX ADMIN — SODIUM CHLORIDE 3 MILLILITER(S): 9 INJECTION INTRAMUSCULAR; INTRAVENOUS; SUBCUTANEOUS at 22:08

## 2023-07-02 RX ADMIN — Medication 975 MILLIGRAM(S): at 15:00

## 2023-07-02 RX ADMIN — Medication 600 MILLIGRAM(S): at 23:44

## 2023-07-02 RX ADMIN — Medication 1 TABLET(S): at 15:09

## 2023-07-02 RX ADMIN — SODIUM CHLORIDE 3 MILLILITER(S): 9 INJECTION INTRAMUSCULAR; INTRAVENOUS; SUBCUTANEOUS at 15:09

## 2023-07-02 RX ADMIN — Medication 975 MILLIGRAM(S): at 15:40

## 2023-07-02 NOTE — OB PROVIDER DELIVERY SUMMARY - NSSELHIDDEN_OBGYN_ALL_OB_FT
[NS_DeliveryAttending1_OBGYN_ALL_OB_FT:[id number]=],[NS_DeliveryAssist1_OBGYN_ALL_OB_FT:[id number]=],[NS_DeliveryRN_OBGYN_ALL_OB_FT:[id number]=],[NS_CirculateRN2_OBGYN_ALL_OB_FT:[id number]=],[NS_DeliveryAssist2_OBGYN_ALL_OB_FT:[id number]=]

## 2023-07-02 NOTE — OB PROVIDER DELIVERY SUMMARY - NSPROVIDERDELIVERYNOTE_OBGYN_ALL_OB_FT
Patient was fully dilated and pushing. Head delivered in OA and restituted to ROT. Anterior shoulder delivered followed by posterior shoulder atraumatically, then rest of the body. The baby was suctioned and placed on mother's abdomen. Delayed cord clamping performed. Cord clamped and cut. Cord gases obtained. Placenta was spontaneously delivered intact. Fundus was massaged and firm. Good hemostasis was noted. Cervix and vagina were inspected. First degree perineal laceration was noted and repaired in the usual fashion with 2-0 chromic, R Labial laceration repaired with 3-0 in standard fashion, with good hemostasis. Viable male delivered APGARS 9/9

## 2023-07-02 NOTE — PROGRESS NOTE ADULT - ASSESSMENT
A/P: 29yo  at 40w3d, GBS neg, with unexplained proteinuria in antepartum periods, EFW 93%ile, s/p breast bx for benign fibroadenoma, SROM  @1220, s/p epidural, on pitocin for augmentation, in active labor    -cont pitocin currently 16mu/min  -pain management w/ epidural  -cont efm/toco  -cont to monitor vitals  -cont iv hydration  -clear liquid diet    Dr Jones aware

## 2023-07-02 NOTE — PROGRESS NOTE ADULT - ASSESSMENT
A/P: 29yo  at 40w4d, GBS neg, with unexplained proteinuria in antepartum periods, EFW 93%ile, s/p breast bx for benign fibroadenoma, SROM  @1220, s/p epidural, on pitocin for augmentation, in active labor    -cont pitocin currently 16mu/min  -pain management w/ epidural  -cont efm/toco  -cont to monitor vitals  -cont iv hydration  -clear liquid diet    Dr. Ayala and Dr. Jones aware

## 2023-07-03 ENCOUNTER — TRANSCRIPTION ENCOUNTER (OUTPATIENT)
Age: 28
End: 2023-07-03

## 2023-07-03 VITALS
TEMPERATURE: 98 F | RESPIRATION RATE: 18 BRPM | HEART RATE: 87 BPM | SYSTOLIC BLOOD PRESSURE: 135 MMHG | DIASTOLIC BLOOD PRESSURE: 85 MMHG

## 2023-07-03 LAB
BASOPHILS # BLD AUTO: 0.04 K/UL — SIGNIFICANT CHANGE UP (ref 0–0.2)
BASOPHILS NFR BLD AUTO: 0.4 % — SIGNIFICANT CHANGE UP (ref 0–1)
COVID-19 SPIKE DOMAIN AB INTERP: POSITIVE
COVID-19 SPIKE DOMAIN ANTIBODY RESULT: >250 U/ML — HIGH
EOSINOPHIL # BLD AUTO: 0.19 K/UL — SIGNIFICANT CHANGE UP (ref 0–0.7)
EOSINOPHIL NFR BLD AUTO: 1.7 % — SIGNIFICANT CHANGE UP (ref 0–8)
HCT VFR BLD CALC: 28.6 % — LOW (ref 37–47)
HGB BLD-MCNC: 9.2 G/DL — LOW (ref 12–16)
IMM GRANULOCYTES NFR BLD AUTO: 0.4 % — HIGH (ref 0.1–0.3)
LYMPHOCYTES # BLD AUTO: 2.64 K/UL — SIGNIFICANT CHANGE UP (ref 1.2–3.4)
LYMPHOCYTES # BLD AUTO: 23.4 % — SIGNIFICANT CHANGE UP (ref 20.5–51.1)
MCHC RBC-ENTMCNC: 23.8 PG — LOW (ref 27–31)
MCHC RBC-ENTMCNC: 32.2 G/DL — SIGNIFICANT CHANGE UP (ref 32–37)
MCV RBC AUTO: 74.1 FL — LOW (ref 81–99)
MONOCYTES # BLD AUTO: 1.08 K/UL — HIGH (ref 0.1–0.6)
MONOCYTES NFR BLD AUTO: 9.6 % — HIGH (ref 1.7–9.3)
NEUTROPHILS # BLD AUTO: 7.3 K/UL — HIGH (ref 1.4–6.5)
NEUTROPHILS NFR BLD AUTO: 64.5 % — SIGNIFICANT CHANGE UP (ref 42.2–75.2)
NRBC # BLD: 0 /100 WBCS — SIGNIFICANT CHANGE UP (ref 0–0)
PLATELET # BLD AUTO: 158 K/UL — SIGNIFICANT CHANGE UP (ref 130–400)
PMV BLD: 14.1 FL — HIGH (ref 7.4–10.4)
RBC # BLD: 3.86 M/UL — LOW (ref 4.2–5.4)
RBC # FLD: 15.3 % — HIGH (ref 11.5–14.5)
SARS-COV-2 IGG+IGM SERPL QL IA: >250 U/ML — HIGH
SARS-COV-2 IGG+IGM SERPL QL IA: POSITIVE
WBC # BLD: 11.3 K/UL — HIGH (ref 4.8–10.8)
WBC # FLD AUTO: 11.3 K/UL — HIGH (ref 4.8–10.8)

## 2023-07-03 PROCEDURE — 99238 HOSP IP/OBS DSCHRG MGMT 30/<: CPT

## 2023-07-03 RX ORDER — MAGNESIUM HYDROXIDE 400 MG/1
30 TABLET, CHEWABLE ORAL
Qty: 0 | Refills: 0 | DISCHARGE
Start: 2023-07-03

## 2023-07-03 RX ORDER — IBUPROFEN 200 MG
1 TABLET ORAL
Qty: 0 | Refills: 0 | DISCHARGE
Start: 2023-07-03

## 2023-07-03 RX ORDER — SIMETHICONE 80 MG/1
1 TABLET, CHEWABLE ORAL
Qty: 0 | Refills: 0 | DISCHARGE
Start: 2023-07-03

## 2023-07-03 RX ORDER — ACETAMINOPHEN 500 MG
3 TABLET ORAL
Qty: 0 | Refills: 0 | DISCHARGE
Start: 2023-07-03

## 2023-07-03 RX ADMIN — SODIUM CHLORIDE 3 MILLILITER(S): 9 INJECTION INTRAMUSCULAR; INTRAVENOUS; SUBCUTANEOUS at 06:53

## 2023-07-03 RX ADMIN — Medication 600 MILLIGRAM(S): at 07:21

## 2023-07-03 RX ADMIN — Medication 600 MILLIGRAM(S): at 00:00

## 2023-07-03 RX ADMIN — Medication 600 MILLIGRAM(S): at 06:27

## 2023-07-03 NOTE — DISCHARGE NOTE OB - CARE PROVIDER_API CALL
Catalina Shepard  Obstetrics and Gynecology  [address] [place] [address]  Phone: [phone]  Fax: [phone]  Follow Up Time:

## 2023-07-03 NOTE — DISCHARGE NOTE OB - NS MD DC FALL RISK RISK
For information on Fall & Injury Prevention, visit: [email or web address] OR  [email or web address] OR  https://www.cdc.gov/steadi/patient.html

## 2023-07-03 NOTE — DISCHARGE NOTE OB - MEDICATION SUMMARY - MEDICATIONS TO STOP TAKING
I will STOP taking the medications listed below when I get home from the hospital:    Prenatal 1 oral capsule  -- orally once a day (at bedtime)

## 2023-07-03 NOTE — DISCHARGE NOTE OB - PATIENT PORTAL LINK FT
You can access the FollowMyHealth Patient Portal offered by [facility] by registering at the following website: [email or web address]. By joining [facility]’s FollowMyHealth portal, you will also be able to view your health information using other applications (apps) compatible with our system.

## 2023-07-03 NOTE — DISCHARGE NOTE OB - MEDICATION SUMMARY - MEDICATIONS TO TAKE
I will START or STAY ON the medications listed below when I get home from the hospital:    ibuprofen 600 mg oral tablet  -- 1 tab(s) by mouth every 6 hours as needed for pain  -- Indication: For pain    acetaminophen 325 mg oral tablet  -- 3 tab(s) by mouth every 6 hours as needed for pain  -- Indication: For pain    magnesium hydroxide 8% oral suspension  -- 30 milliliter(s) by mouth 2 times a day As needed Constipation  -- Indication: For constipation    simethicone 80 mg oral tablet, chewable  -- 1 tab(s) by mouth every 4 hours As needed Gas  -- Indication: For gas

## 2023-07-03 NOTE — PROGRESS NOTE ADULT - SUBJECTIVE AND OBJECTIVE BOX
PGY2 Note    S: Patient seen and examined at bedside. Doing well. Reports pain with contractions and pelvic pressure.       Vital Signs Last 24 Hrs  T(C): 37.1 (2023 23:12), Max: 37.1 (2023 23:12)  T(F): 98.78 (2023 23:12), Max: 98.78 (2023 23:12)  HR: 99 (2023 03:01) (57 - 105)  BP: 109/73 (2023 02:56) (99/71 - 137/66)  RR: 16 (2023 16:00) (16 - 16)  SpO2: 96% (2023 03:01) (84% - 100%)    Parameters below as of 2023 13:54  Patient On (Oxygen Delivery Method): room air    EFM: 130/mod silvia/pos accel  TOCO: q2-3 min  SVE: 9/100/0 AROM forewaters @ 0500     Labs:                        10.7   9.88  )-----------( 163      ( 2023 14:17 )             33.7         ABO RH Interpretation: A POS (23 @ 14:17)    Urinalysis Basic - ( 2023 14:17 )  Color: Light Yellow / Appearance: Clear / S.008 / pH: x  Gluc: x / Ketone: Negative  / Bili: Negative / Urobili: <2 mg/dL   Blood: x / Protein: Trace / Nitrite: Negative   Leuk Esterase: Large / RBC: 2 /HPF / WBC 4 /HPF   Sq Epi: x / Non Sq Epi: x / Bacteria: Negative    Prenatal Syphilis Test: Nonreact (23 @ 14:17)
PGY3 Note    S: Patient seen and examined at bedside. Doing well. Denies pain. Reports some pelvic pressure.     Vital Signs Last 24 Hrs  T(C): 37.1 (2023 23:12), Max: 37.1 (2023 23:12)  T(F): 98.78 (2023 23:12), Max: 98.78 (2023 23:12)  HR: 99 (2023 03:01) (57 - 105)  BP: 109/73 (2023 02:56) (99/71 - 137/66)  RR: 16 (2023 16:00) (16 - 16)  SpO2: 96% (2023 03:01) (84% - 100%)    Parameters below as of 2023 13:54  Patient On (Oxygen Delivery Method): room air    EFM: 130/mod silvia/pos accel  TOCO: q2-3 min  SVE: 7/90/0     Labs:                        10.7   9.88  )-----------( 163      ( 2023 14:17 )             33.7         ABO RH Interpretation: A POS (23 @ 14:17)    Urinalysis Basic - ( 2023 14:17 )  Color: Light Yellow / Appearance: Clear / S.008 / pH: x  Gluc: x / Ketone: Negative  / Bili: Negative / Urobili: <2 mg/dL   Blood: x / Protein: Trace / Nitrite: Negative   Leuk Esterase: Large / RBC: 2 /HPF / WBC 4 /HPF   Sq Epi: x / Non Sq Epi: x / Bacteria: Negative    Prenatal Syphilis Test: Nonreact (23 @ 14:17)
PGY 1 Note    Patient seen at bedside for evaluation of labor progression s/p epidural doing well, no complaints.     T(F): 98.2 (23 @ 13:54), Max: 98.2 (23 @ 13:03)  HR: 92 (23 @ 16:41) (82 - 104)  BP: 119/70 (23 @ 16:40) (108/65 - 128/69)  RR: 16 (23 @ 16:00) (16 - 16)  SpO2: 99% (23 @ 16:41) (98% - 100%)    EFM: 120/mod silvia/+ accels/cat1  TOCO: irreg  SVE: 3/80/-2 @1645     Medications:  (ADM OVERRIDE): 1 Each (23 @ 15:50)  (ADM OVERRIDE): 1 Each (23 @ 15:50)      Labs:                        10.7   9.88  )-----------( 163      ( 2023 14:17 )             33.7           ABO RH Interpretation: A POS (23 @ 14:17)    Antibody Screen: NEG (23 @ 14:17)    Urinalysis Basic - ( 2023 14:17 )    Color: Light Yellow / Appearance: Clear / S.008 / pH: x  Gluc: x / Ketone: Negative  / Bili: Negative / Urobili: <2 mg/dL   Blood: x / Protein: Trace / Nitrite: Negative   Leuk Esterase: Large / RBC: 2 /HPF / WBC 4 /HPF   Sq Epi: x / Non Sq Epi: x / Bacteria: Negative      L&amp;D Drug Screen, Urine: Done (23 @ 14:17)    Prenatal Syphilis Test: Nonreact (23 @ 14:17)              
PGY1 Note    Patient seen and examined. Pain well controlled at this time. No complaints at this time. Denies fever, chills, nausea, vomiting, chest pain, shortness of breath, severe abdominal pain, heavy vaginal bleeding.     Patient is ambulating.   Tolerating regular diet   Voiding without difficulty, no dysuria     MEDICATIONS  (STANDING):  acetaminophen     Tablet .. 975 milliGRAM(s) Oral <User Schedule>  diphtheria/tetanus/pertussis (acellular) Vaccine (Adacel) 0.5 milliLiter(s) IntraMuscular once  ibuprofen  Tablet. 600 milliGRAM(s) Oral every 6 hours  ketorolac   Injectable 30 milliGRAM(s) IV Push once  oxytocin Infusion 41.667 milliUNIT(s)/Min (125 mL/Hr) IV Continuous <Continuous>  prenatal multivitamin 1 Tablet(s) Oral daily  sodium chloride 0.9% lock flush 3 milliLiter(s) IV Push every 8 hours    Physical Exam  Vital Signs Last 24 Hrs  T(C): 36.9 (02 Jul 2023 23:30), Max: 37.3 (02 Jul 2023 15:47)  T(F): 98.4 (02 Jul 2023 23:30), Max: 99.1 (02 Jul 2023 15:47)  HR: 91 (02 Jul 2023 23:30) (53 - 118)  BP: 101/60 (02 Jul 2023 23:30) (101/60 - 130/78)  RR: 18 (02 Jul 2023 23:30) (18 - 18)  SpO2: 94% (02 Jul 2023 10:23) (61% - 100%)    Gen: AAOx3, NAD  Cardio: RRR, S1S2, no M/R/G  Resp: CTAB  Ext: No calf tenderness, no swelling  Fundus: firm, below umbilicus. Nontender.   Abd: Soft, nontender, nondistended, BS+  Lochia: minimal       Labs:                        10.7   9.88  )-----------( 163      ( 01 Jul 2023 14:17 )             33.7    
PGY 1 Note    Patient seen at bedside for evaluation of labor progression, doing well, complaining of R side contraction pain.       T(F): 98.42 (23 @ 19:05), Max: 98.42 (23 @ 19:05)  HR: 97 (23 @ 22:51) (82 - 105)  BP: 126/73 (23 @ 22:42) (107/68 - 137/66)  RR: 16 (23 @ 16:00) (16 - 16)  SpO2: 99% (23 @ 22:51) (87% - 100%)    EFM: 120/mod silvia/+ accels/cat1  TOCO: q3min  SVE: 5/90/-2 vertex, ruptured @2250     Medications:  (ADM OVERRIDE): 1 Each (23 @ 15:50)  (ADM OVERRIDE): 1 Each (23 @ 15:50)  (ADM OVERRIDE): 1 Each (23 @ 16:54)  lactated ringers.: 125 mL/Hr (23 @ 14:10)  oxytocin Infusion.: 2 mL/Hr (23 @ 16:47)      Labs:                        10.7   9.88  )-----------( 163      ( 2023 14:17 )             33.7           ABO RH Interpretation: A POS (23 @ 14:17)    Antibody Screen: NEG (23 @ 14:17)    Urinalysis Basic - ( 2023 14:17 )    Color: Light Yellow / Appearance: Clear / S.008 / pH: x  Gluc: x / Ketone: Negative  / Bili: Negative / Urobili: <2 mg/dL   Blood: x / Protein: Trace / Nitrite: Negative   Leuk Esterase: Large / RBC: 2 /HPF / WBC 4 /HPF   Sq Epi: x / Non Sq Epi: x / Bacteria: Negative      L&amp;D Drug Screen, Urine: Done (23 @ 14:17)    Prenatal Syphilis Test: Nonreact (23 @ 14:17)                
PGY 2 Note    back-charting due to clinical responsibilities     Patient seen at bedside for evaluation of labor progression, doing well, no complaints.   - s/p epidural   - currently on pitocin     T(F): 98.2 (23 @ 13:54), Max: 98.2 (23 @ 13:03)  HR: 97 (23 @ 22:01) (82 - 105)  BP: 125/76 (23 @ 21:56) (107/68 - 137/66)  RR: 16 (23 @ 16:00) (16 - 16)  SpO2: 99% (23 @ 22:01) (87% - 100%)    EFM: 120/mod/+ accels/cat1  TOCO: q2-3min  SVE: /-2 vertex ruptured @    Medications:  (ADM OVERRIDE): 1 Each (23 @ 15:50)  (ADM OVERRIDE): 1 Each (23 @ 15:50)  (ADM OVERRIDE): 1 Each (23 @ 16:54)  lactated ringers.: 125 mL/Hr (23 @ 14:10)  oxytocin Infusion.: 2 mL/Hr (23 @ 16:47)      Labs:                        10.7   9.88  )-----------( 163      ( 2023 14:17 )             33.7           ABO RH Interpretation: A POS (23 @ 14:17)    Antibody Screen: NEG (23 @ 14:17)    Urinalysis Basic - ( 2023 14:17 )    Color: Light Yellow / Appearance: Clear / S.008 / pH: x  Gluc: x / Ketone: Negative  / Bili: Negative / Urobili: <2 mg/dL   Blood: x / Protein: Trace / Nitrite: Negative   Leuk Esterase: Large / RBC: 2 /HPF / WBC 4 /HPF   Sq Epi: x / Non Sq Epi: x / Bacteria: Negative      L&amp;D Drug Screen, Urine: Done (23 @ 14:17)    Prenatal Syphilis Test: Nonreact (23 @ 14:17)

## 2023-07-03 NOTE — PROGRESS NOTE ADULT - ASSESSMENT
A/P: 29yo now P1 s/p , PPD1, recovering well    - encourage ambulation  - PO hydration  - Continue diet as tolerated   - Monitor vitals and bleeding  - f/u AM CBC     Dr. Fan and OB attending to be aware A/P: 27yo now P1 s/p , PPD1, recovering well    - encourage ambulation  - PO hydration  - Continue diet as tolerated   - Monitor vitals and bleeding  - f/u AM CBC

## 2023-07-07 DIAGNOSIS — Z3A.40 40 WEEKS GESTATION OF PREGNANCY: ICD-10-CM

## 2023-07-07 DIAGNOSIS — O42.92 FULL-TERM PREMATURE RUPTURE OF MEMBRANES, UNSPECIFIED AS TO LENGTH OF TIME BETWEEN RUPTURE AND ONSET OF LABOR: ICD-10-CM

## 2023-07-20 ENCOUNTER — RESULT REVIEW (OUTPATIENT)
Age: 28
End: 2023-07-20

## 2023-07-20 ENCOUNTER — OUTPATIENT (OUTPATIENT)
Dept: OUTPATIENT SERVICES | Facility: HOSPITAL | Age: 28
LOS: 1 days | End: 2023-07-20
Payer: MEDICAID

## 2023-07-20 DIAGNOSIS — K08.409 PARTIAL LOSS OF TEETH, UNSPECIFIED CAUSE, UNSPECIFIED CLASS: Chronic | ICD-10-CM

## 2023-07-20 DIAGNOSIS — R92.8 OTHER ABNORMAL AND INCONCLUSIVE FINDINGS ON DIAGNOSTIC IMAGING OF BREAST: ICD-10-CM

## 2023-07-20 PROCEDURE — 76642 ULTRASOUND BREAST LIMITED: CPT | Mod: 50

## 2023-07-20 PROCEDURE — 76642 ULTRASOUND BREAST LIMITED: CPT | Mod: 26,50

## 2023-07-21 DIAGNOSIS — R92.8 OTHER ABNORMAL AND INCONCLUSIVE FINDINGS ON DIAGNOSTIC IMAGING OF BREAST: ICD-10-CM

## 2023-08-09 ENCOUNTER — OUTPATIENT (OUTPATIENT)
Dept: OUTPATIENT SERVICES | Facility: HOSPITAL | Age: 28
LOS: 1 days | End: 2023-08-09
Payer: MEDICAID

## 2023-08-09 ENCOUNTER — APPOINTMENT (OUTPATIENT)
Dept: OBGYN | Facility: CLINIC | Age: 28
End: 2023-08-09
Payer: MEDICAID

## 2023-08-09 VITALS
HEIGHT: 67 IN | DIASTOLIC BLOOD PRESSURE: 90 MMHG | SYSTOLIC BLOOD PRESSURE: 120 MMHG | BODY MASS INDEX: 28.72 KG/M2 | WEIGHT: 183 LBS

## 2023-08-09 DIAGNOSIS — Z34.90 ENCOUNTER FOR SUPERVISION OF NORMAL PREGNANCY, UNSPECIFIED, UNSPECIFIED TRIMESTER: ICD-10-CM

## 2023-08-09 DIAGNOSIS — K08.409 PARTIAL LOSS OF TEETH, UNSPECIFIED CAUSE, UNSPECIFIED CLASS: Chronic | ICD-10-CM

## 2023-08-09 PROCEDURE — 99213 OFFICE O/P EST LOW 20 MIN: CPT

## 2023-08-09 NOTE — HISTORY OF PRESENT ILLNESS
[Postpartum Follow Up] : postpartum follow up [Delivery Date: ___] : on [unfilled] [] : delivered by vaginal delivery [Breastfeeding] : currently nursing [Resumed Olar] : has resumed intercourse [Intended Contraception] : Intended Contraception: [Condoms] : condoms [Back to Normal] : is back to normal in size [None] : no vaginal bleeding [Normal] : the vagina was normal [Examination Of The Breasts] : breasts are normal [Doing Well] : is doing well [Complications:___] : no complications [Resumed Menses] : has not resumed her menses [de-identified] : 27yo P1, s/p  23, 6 weeks postpartum, doing well.   [de-identified] : RTC 1 year for annual exam or sooner as needed.

## 2023-08-15 ENCOUNTER — APPOINTMENT (OUTPATIENT)
Dept: BREAST CENTER | Facility: CLINIC | Age: 28
End: 2023-08-15
Payer: MEDICAID

## 2023-08-15 VITALS
WEIGHT: 183 LBS | BODY MASS INDEX: 28.72 KG/M2 | SYSTOLIC BLOOD PRESSURE: 134 MMHG | HEART RATE: 100 BPM | DIASTOLIC BLOOD PRESSURE: 88 MMHG | HEIGHT: 67 IN

## 2023-08-15 DIAGNOSIS — D24.1 BENIGN NEOPLASM OF RIGHT BREAST: ICD-10-CM

## 2023-08-15 DIAGNOSIS — N63.10 UNSPECIFIED LUMP IN THE RIGHT BREAST, UNSPECIFIED QUADRANT: ICD-10-CM

## 2023-08-15 DIAGNOSIS — N63.20 UNSPECIFIED LUMP IN THE RIGHT BREAST, UNSPECIFIED QUADRANT: ICD-10-CM

## 2023-08-15 DIAGNOSIS — R92.8 OTHER ABNORMAL AND INCONCLUSIVE FINDINGS ON DIAGNOSTIC IMAGING OF BREAST: ICD-10-CM

## 2023-08-15 PROCEDURE — 99214 OFFICE O/P EST MOD 30 MIN: CPT

## 2023-08-15 NOTE — DATA REVIEWED
[FreeTextEntry1] : CC: [id number]     EXAM:  MG US BREAST LIMITED BI    07/20/2023 INTERPRETATION:  Clinical History / Reason for exam: Follow-up bilateral breast masses.  Additional history: Currently breast-feeding x2 weeks.  The patient reports last clinical breast examination was performed about: Within the past year.  Family history of breast cancer: Paternal grandmother.  Comparisons: Breast ultrasound dating back 2022.  Findings:  Ultrasound:  Targeted bilateral breast ultrasound was performed.  Right breast: At 8:00 N7 there is a stable previously biopsied benign hypoechoic mass measuring 2.1 x 0.4 x 1.1 cm.  Left breast: At 1-2 o'clock N5 there is an mild increase in size of previously biopsied benign hypoechoic mass measuring 2.8 x 1.3 x 3.8 cm (patient currently breast-feeding x2 weeks). However, short interval follow-up recommended. At 3:00 N5 there is a stable hypoechoic mass measuring 1.0 x 1.2 x 0.6 cm. Short interval follow-up recommended. At 7:00 N2 there is a stable hypoechoic mass measuring 1.4 x 1.4 x 0.5 cm. Short interval follow-up recommended. At 6:00 N2 there is mild increase in size of prior noted hypoechoic mass measuring 3.0 x 0.9 x 3.3 cm; this may be lactational changes. However, short interval follow-up recommended.  Short interval follow-up above left breast masses recommended.  Impression: 1. Stable left breast masses as above. Short interval follow-up recommended. 2. Stable right breast mass.  Recommendation: Follow-up breast ultrasound in 6 months.  BI-RADS category 3: Probably Benign   The above findings and recommendations were discussed with the patient at the time of the examination.

## 2023-08-15 NOTE — HISTORY OF PRESENT ILLNESS
[FreeTextEntry1] : Patient is a 28F with right breast discordant mass s/p excision on 2/6/23 with final path showing FA and adjacent IDP.  FHx: Denies  Her imaging is as follows:  12/20/22: B US (right palp) --> BIRADS 4 Right breast At 9-11 o'clock 2 to 7 cm from the nipple, there is a 9 cm ovoid hypoechoic mass with slightly lobulated margins --> REC BX At 8:00 7 cm from the nipple, there is a 20 x 11 x 5 mm lobulated hypoechoic well-circumscribed mass --> REC BX Left breast: At 1-2 o'clock 5 cm from the nipple, there is a 23 x 18 x 8 mm lobulated hypoechoic well-circumscribed mass --> REC Bx At 3:00 5 cm from the nipple, there is a 10 x 7 x 4 mm lobulated hypoechoic mass. At 7:00 2 cm from the nipple, there is a 14 x 15 x 5 mm ovoid lobulated well-circumscribed mass At 6:00 2 cm from the nipple, there is an 18 x 16 x 7 mm lobulated well-circumscribed mass. Recommend USGB x 3 and 6 month follow up for remaining likely benign masses.  12/28/22: USGB x 3 1. Right breast 8:00 N7 Fragments of fibroadenoma (top hat) (benign concordant)  2.  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 (stoplight) (DISCORDANT, recommend surgical excision)  3.  Left breast, 1 -2:00 N5 - Fragments of fibroadenoma with foci of perilobular and periductular chronic inflammation (minicork) (benign concordant)  2/6/23: R excision of discordant breast mass 9-11 o'clock - Fibroadenoma containing a healing prior biopsy site, sclerosing adenosis, cystic/papillary apocrine metaplasia, and epithelial microcalcifications (complex fibroadenoma). - Adjacent intraductal papilloma with apocrine metaplasia. 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  Her most recent imaging is as follows: 7/20/2023 b/l US--> BIRADS 3 Right breast: -@8:00 N7 there is a stable previously biopsied benign hypoechoic mass measuring 2.1 x 0.4 x 1.1 cm. Left breast: -@1-2 N5 there is an mild increase in size of previously biopsied benign hypoechoic mass measuring 2.8 x 1.3 x 3.8 cm (patient currently breast-feeding x2 weeks). However, short interval follow-up recommended. -@3:00 N5 there is a stable hypoechoic mass measuring 1.0 x 1.2 x 0.6 cm. Short interval follow-up recommended. -@ 7:00 N2 there is a stable hypoechoic mass measuring 1.4 x 1.4 x 0.5 cm. Short interval follow-up recommended. -@ 6:00 N2 there is mild increase in size of prior noted hypoechoic mass measuring 3.0 x 0.9 x 3.3 cm; this may be lactational changes. However, short interval follow-up recommended. Recommend left US in 6 months  Denies any current complaints. no new changes to her breasts.

## 2023-08-15 NOTE — PHYSICAL EXAM
[Normocephalic] : normocephalic [EOMI] : extra ocular movement intact [No Supraclavicular Adenopathy] : no supraclavicular adenopathy [No Cervical Adenopathy] : no cervical adenopathy [Examined in the supine and seated position] : examined in the supine and seated position [No dominant masses] : no dominant masses in right breast  [No dominant masses] : no dominant masses left breast [No Nipple Retraction] : no left nipple retraction [No Nipple Discharge] : no left nipple discharge [No Axillary Lymphadenopathy] : no left axillary lymphadenopathy [No Rashes] : no rashes [No Ulceration] : no ulceration [de-identified] : Nl respirations [de-identified] : Bilateral nodularities [de-identified] : Bilateral nodularities

## 2023-08-15 NOTE — PAST MEDICAL HISTORY
[Menarche Age ____] : age at menarche was [unfilled] [Total Preg ___] : G[unfilled] [de-identified] : Pregnant 17 weeks [FreeTextEntry3] : Pregnant 17 weeks [FreeTextEntry2] : Presently pregnant [FreeTextEntry6] : None [FreeTextEntry7] : None [FreeTextEntry8] : None

## 2023-08-15 NOTE — ASSESSMENT
[FreeTextEntry1] : Patient is a 27F with discordant right 9cm mass, s/p excision on 2/6/23 with pathology showing FA with adjacent IDP.  She underwent a bilateral US in 12/2022 that showed bilateral breast mass, three of which were recommended biopsy, with the rest being deemed likely benign with 6 month follow up.  Right 8N7 2cm mass was biopsied to be fragment of a FA (tophat, bening concordant).  Left 1-2N5 2.3cm mass was biopsied to be fragment of FA with chronic inflammation (minicork, benign concordant).  Right 9-11N2-7 7cm mass was biopsied to be fragment of benign tissue and fibroadenomatous change, foci of PASH and multiple foci of chronic inflammation (stoplight, discordant - recommend surgical excisional biopsy). Further discussion with radiology raised the concern of ruling out a phyllodes tumor.  She underwent excision on 2/6/23 that showed a FA with an adjacent IDP.  She most recently had bilateral is in 7/2023 which showed right stable bx bening 2.1cm mass, along with left 1-2N5 mildly increased mass of 3.8cm, 6N2 mildly increased mass on 3cm (could be lactational changes as patient is breast feeding), and stable 3N5 1cm and 7N2 1.4cm masses (BIRADS 3) with short term follow recommended.  She can choose to follow up imaging. She is also aware that she can also choose to biopsy the lesion if she wishes to. I briefly discussed the procedure will be performed by a breast imaging specialist, and will include numbing with local anesthesia, followed by a small biopsy marker placement afterwards, which is usually not bothersome, and is not recognized by radiofrequency devices.  We discussed that the increase in the sizes of the masses could be due to her breastfeeding. She understands her options and wants to proceed with short term observation at this time.  All questions and concerns were answered in detail.  Patient is for left US 1/2024.  She is to follow up after imaging, pending any interval changes.  Total time spent on encounter was greater than 30 minutes , which included face to face time with the patient, performing an exam, reviewing previous medical records, reviewing current imaging/ pathology, documenting in patient record and coordinating care/imaging. Greater than 50% of the encounter was spent on counseling and coordination of her breast issue.

## 2024-01-11 ENCOUNTER — NON-APPOINTMENT (OUTPATIENT)
Age: 29
End: 2024-01-11

## 2024-02-04 NOTE — OB RN DELIVERY SUMMARY - AMNIOTIC FLUID COLOR, LABOR
clear [10] : 10 [Sharp] : sharp [Constant] : constant [Meds] : meds [de-identified] : 02/02/2024 - The patient has returned for a follow-up and notes an overall increase in pain attributed to changes in weather. Specifically, she reports experiencing low back pain with radiation into the left leg. Additionally, she mentions being actively involved in a gym program and is in the process of gradually reducing her intake of oxycodone.   12/15/2023 - Patient presenting in follow up.  She was diagnosed with bronchitis; the patient had a recent X-ray of the lungs that revealed incidental finding. She is here for a review of these unexpected findings. Has been weening off oxycodone.   10/04/2023 - Patient presenting in follow up. Overall neck and low back pain is stable but flare up of symptoms are recent shingles diagnosis. Treated with antiviral for shingles and symptoms are starting to improve this week. Continues to recover from radius/ulnar surgery. Treating with pain killer as prescribed with relief, she is weening down.   08/17/2023 - Patient presenting in follow up. Patient slipped and fell yesterday morning at 5:30 AM due to leg cramping, resulting in broken radius and ulnar, she is scheduled for emergency surgery on Tuesday for Left wrist. Has been treating this with percoet and pain is breaking through.   06/21/2023 - Patient presenting in follow up. Chief complaint of numbness throughout the left lower extremity. Severity is worse at nighttime, more controlled throughout the day. Scheduled for appointment with PM end of July. Admits to neck pain, present day leg pain > neck pain. Treatment with Percocet, Lyrica, and Naprosyn provides relief for symptom control. No change to general medical health.   5/3/23: Patient presenting in follow up. Complains of tingling throughout the right upper extremity, worse at nighttime. Positional movements are able to reduce the severity of paresthesias, but remains significant.  Back pain controlled at this time with prescribed medications. Treatment with Percocet, Lyrica, and Naprosyn provides relief for symptom control.   3/15/23: Patient presenting for a follow up. Patient has been doing well since her last visit. No significant changes. Severity of pain ranges day to day, worse with weather changes. She reports resolution of bursitis pain, but complains of axial lower back pain. Treatment with Lyrica provides relief of LE radic. Treatment with Percocet and Naprosyn provides relief for symptom control. Completing physical therapy for back and shoulder which has been helpful.   02/01/2023 - 60 y/o F presenting for a follow up. She reports aggravated right sided buttock/hip pain radiating to the left side of the stomach and back. Trigger point injection at her last into the visit into right trochanteric hip bursa provided relief for 3-4 days. Her recent back pain is a different character type of pain compared to preoperative pains. Neck pain still present, but low back pain is more prominent at this time. Ibuprofen, lyrica, Percocet, is helpful for symptom control.   12/16/2022 - The patient is a 59 year female who presents today follow up neck, left arm. PT has been helpful for her surgery pain. She has a shooting pain from her middle finger all the wall up her arm, pain has been there since her surgery (09/08/2022). Taking lyrica, ibuprofen and percocet for the pain, which helps the pain slightly. No positioning of the neck triggers the pain.   Date of Injury/Onset:chronic Pain:    At Rest: 4/10  With Activity:  8/10  Quality of symptoms: throbbing, nerve pain Improves with: lyrica, ibuprofen, percocet Worse with: activity Working full time  10/07/2022 - Patient returns to the office for a follow up regarding lumbar spine. Symptoms essentially remain on changed since last visit.  Pain is in the central part of our lower back with some intimate symptoms into the leg. She continues to use lyrica. She remains satisfied with progress from surgery.  Continues home exercises. Working full time. Patient is pleased with progress from surgery.   Shoulder Surgery with Dr. Vazquez 09/08/2022 [] : no [FreeTextEntry1] : L-spine [FreeTextEntry5] : Pt is here for follow up on L-spine. States that painkillers do not help manage the pain. States she had a fall yesterday and landed on her back and arm. States she cannot feel anything besides her arm.

## 2024-08-06 NOTE — OB PROVIDER H&P - [email or web address]

## 2024-08-14 ENCOUNTER — APPOINTMENT (OUTPATIENT)
Dept: OBGYN | Facility: CLINIC | Age: 29
End: 2024-08-14